# Patient Record
Sex: MALE | Race: WHITE | Employment: FULL TIME | URBAN - METROPOLITAN AREA
[De-identification: names, ages, dates, MRNs, and addresses within clinical notes are randomized per-mention and may not be internally consistent; named-entity substitution may affect disease eponyms.]

---

## 2018-01-21 ENCOUNTER — APPOINTMENT (OUTPATIENT)
Dept: CT IMAGING | Age: 53
DRG: 252 | End: 2018-01-21
Attending: EMERGENCY MEDICINE
Payer: OTHER GOVERNMENT

## 2018-01-21 ENCOUNTER — HOSPITAL ENCOUNTER (INPATIENT)
Age: 53
LOS: 3 days | Discharge: HOME | DRG: 252 | End: 2018-01-24
Attending: EMERGENCY MEDICINE | Admitting: INTERNAL MEDICINE
Payer: OTHER GOVERNMENT

## 2018-01-21 DIAGNOSIS — I26.99 OTHER ACUTE PULMONARY EMBOLISM WITHOUT ACUTE COR PULMONALE (HCC): ICD-10-CM

## 2018-01-21 DIAGNOSIS — R55 SYNCOPE AND COLLAPSE: Primary | ICD-10-CM

## 2018-01-21 LAB
ALBUMIN SERPL-MCNC: 3.1 G/DL (ref 3.4–5)
ALBUMIN/GLOB SERPL: 0.7 {RATIO} (ref 0.8–1.7)
ALP SERPL-CCNC: 80 U/L (ref 45–117)
ALT SERPL-CCNC: 30 U/L (ref 16–61)
ANION GAP SERPL CALC-SCNC: 8 MMOL/L (ref 3–18)
APTT PPP: 23 SEC (ref 23–36.4)
APTT PPP: 41 SEC (ref 23–36.4)
AST SERPL-CCNC: 39 U/L (ref 15–37)
BASOPHILS # BLD: 0 K/UL (ref 0–0.1)
BASOPHILS NFR BLD: 0 % (ref 0–2)
BILIRUB SERPL-MCNC: 0.6 MG/DL (ref 0.2–1)
BNP SERPL-MCNC: 189 PG/ML (ref 0–900)
BUN SERPL-MCNC: 13 MG/DL (ref 7–18)
BUN/CREAT SERPL: 13 (ref 12–20)
CALCIUM SERPL-MCNC: 8.8 MG/DL (ref 8.5–10.1)
CHLORIDE SERPL-SCNC: 107 MMOL/L (ref 100–108)
CK MB CFR SERPL CALC: 5.7 % (ref 0–4)
CK MB CFR SERPL CALC: ABNORMAL % (ref 0–4)
CK MB SERPL-MCNC: 2.5 NG/ML (ref 5–25)
CK MB SERPL-MCNC: <1 NG/ML (ref 5–25)
CK SERPL-CCNC: 37 U/L (ref 39–308)
CK SERPL-CCNC: 44 U/L (ref 39–308)
CO2 SERPL-SCNC: 28 MMOL/L (ref 21–32)
CREAT SERPL-MCNC: 0.98 MG/DL (ref 0.6–1.3)
DIFFERENTIAL METHOD BLD: ABNORMAL
EOSINOPHIL # BLD: 0.2 K/UL (ref 0–0.4)
EOSINOPHIL NFR BLD: 2 % (ref 0–5)
ERYTHROCYTE [DISTWIDTH] IN BLOOD BY AUTOMATED COUNT: 16.6 % (ref 11.6–14.5)
GLOBULIN SER CALC-MCNC: 4.7 G/DL (ref 2–4)
GLUCOSE SERPL-MCNC: 97 MG/DL (ref 74–99)
HCT VFR BLD AUTO: 41.8 % (ref 36–48)
HGB BLD-MCNC: 14 G/DL (ref 13–16)
INR PPP: 1 (ref 0.8–1.2)
LYMPHOCYTES # BLD: 2.6 K/UL (ref 0.9–3.6)
LYMPHOCYTES NFR BLD: 26 % (ref 21–52)
MCH RBC QN AUTO: 26.9 PG (ref 24–34)
MCHC RBC AUTO-ENTMCNC: 33.5 G/DL (ref 31–37)
MCV RBC AUTO: 80.4 FL (ref 74–97)
MONOCYTES # BLD: 0.8 K/UL (ref 0.05–1.2)
MONOCYTES NFR BLD: 8 % (ref 3–10)
NEUTS SEG # BLD: 6.6 K/UL (ref 1.8–8)
NEUTS SEG NFR BLD: 64 % (ref 40–73)
PLATELET # BLD AUTO: 158 K/UL (ref 135–420)
PMV BLD AUTO: 9.9 FL (ref 9.2–11.8)
POTASSIUM SERPL-SCNC: 3.4 MMOL/L (ref 3.5–5.5)
PROT SERPL-MCNC: 7.8 G/DL (ref 6.4–8.2)
PROTHROMBIN TIME: 12.8 SEC (ref 11.5–15.2)
RBC # BLD AUTO: 5.2 M/UL (ref 4.7–5.5)
SODIUM SERPL-SCNC: 143 MMOL/L (ref 136–145)
TROPONIN I SERPL-MCNC: 0.11 NG/ML (ref 0–0.04)
TROPONIN I SERPL-MCNC: 1.08 NG/ML (ref 0–0.04)
WBC # BLD AUTO: 10.3 K/UL (ref 4.6–13.2)

## 2018-01-21 PROCEDURE — 70450 CT HEAD/BRAIN W/O DYE: CPT

## 2018-01-21 PROCEDURE — 74011250636 HC RX REV CODE- 250/636: Performed by: EMERGENCY MEDICINE

## 2018-01-21 PROCEDURE — 96366 THER/PROPH/DIAG IV INF ADDON: CPT

## 2018-01-21 PROCEDURE — 85730 THROMBOPLASTIN TIME PARTIAL: CPT | Performed by: EMERGENCY MEDICINE

## 2018-01-21 PROCEDURE — 85025 COMPLETE CBC W/AUTO DIFF WBC: CPT | Performed by: EMERGENCY MEDICINE

## 2018-01-21 PROCEDURE — 93005 ELECTROCARDIOGRAM TRACING: CPT

## 2018-01-21 PROCEDURE — 71275 CT ANGIOGRAPHY CHEST: CPT

## 2018-01-21 PROCEDURE — 99284 EMERGENCY DEPT VISIT MOD MDM: CPT

## 2018-01-21 PROCEDURE — 74011636320 HC RX REV CODE- 636/320: Performed by: EMERGENCY MEDICINE

## 2018-01-21 PROCEDURE — C1751 CATH, INF, PER/CENT/MIDLINE: HCPCS

## 2018-01-21 PROCEDURE — 83880 ASSAY OF NATRIURETIC PEPTIDE: CPT | Performed by: EMERGENCY MEDICINE

## 2018-01-21 PROCEDURE — 75810000137 HC PLCMT CENT VENOUS CATH

## 2018-01-21 PROCEDURE — 65270000029 HC RM PRIVATE

## 2018-01-21 PROCEDURE — 74011000250 HC RX REV CODE- 250: Performed by: PHYSICIAN ASSISTANT

## 2018-01-21 PROCEDURE — 82550 ASSAY OF CK (CPK): CPT | Performed by: EMERGENCY MEDICINE

## 2018-01-21 PROCEDURE — 80053 COMPREHEN METABOLIC PANEL: CPT | Performed by: EMERGENCY MEDICINE

## 2018-01-21 PROCEDURE — 85610 PROTHROMBIN TIME: CPT | Performed by: EMERGENCY MEDICINE

## 2018-01-21 PROCEDURE — 96365 THER/PROPH/DIAG IV INF INIT: CPT

## 2018-01-21 RX ORDER — HEPARIN SODIUM 1000 [USP'U]/ML
80 INJECTION, SOLUTION INTRAVENOUS; SUBCUTANEOUS ONCE
Status: COMPLETED | OUTPATIENT
Start: 2018-01-21 | End: 2018-01-21

## 2018-01-21 RX ORDER — HEPARIN SODIUM 10000 [USP'U]/100ML
18-36 INJECTION, SOLUTION INTRAVENOUS
Status: DISCONTINUED | OUTPATIENT
Start: 2018-01-21 | End: 2018-01-23

## 2018-01-21 RX ADMIN — SODIUM CHLORIDE 1000 ML: 900 INJECTION, SOLUTION INTRAVENOUS at 16:45

## 2018-01-21 RX ADMIN — HEPARIN SODIUM AND DEXTROSE 18 UNITS/KG/HR: 10000; 5 INJECTION INTRAVENOUS at 14:11

## 2018-01-21 RX ADMIN — HEPARIN SODIUM 8640 UNITS: 1000 INJECTION, SOLUTION INTRAVENOUS; SUBCUTANEOUS at 14:23

## 2018-01-21 RX ADMIN — SODIUM CHLORIDE 1000 ML: 900 INJECTION, SOLUTION INTRAVENOUS at 16:05

## 2018-01-21 RX ADMIN — ALTEPLASE 100 MG: KIT at 17:30

## 2018-01-21 RX ADMIN — IOPAMIDOL 90 ML: 755 INJECTION, SOLUTION INTRAVENOUS at 14:52

## 2018-01-21 RX ADMIN — FAMOTIDINE 20 MG: 10 INJECTION, SOLUTION INTRAVENOUS at 17:20

## 2018-01-21 RX ADMIN — SODIUM CHLORIDE 1000 ML: 900 INJECTION, SOLUTION INTRAVENOUS at 17:39

## 2018-01-21 RX ADMIN — HEPARIN SODIUM AND DEXTROSE 22 UNITS/KG/HR: 10000; 5 INJECTION INTRAVENOUS at 21:03

## 2018-01-21 NOTE — CONSULTS
[facility]  Pulmonary, Critical Care, and Sleep Medicine      Name: Tucker Bright MRN: [id number]   : 1965 Hospital: [address] [place] [address]   Date: 2018          Critical Care Initial Patient Consult    Requesting MD: Dr. Corinne Esteban                                                 Reason for CC Consult: PE    IMPRESSION:   · Acute bilateral pulmonary embolism, possible massive with intermittent hypotension cycling with normotensive pressures. No echo as of yet to determine RV stain/dilation. RV strain by CT via RV/LV ratio. Troponin 0.11, . 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 Titrate to maintain SpO2 >92%  · ID - No acute issues  · CVS - Bilateral pulmonary embolism as described above. Borderline hypotension. Currently receiving IVF bolus for hypotension. Monitor for improvement in BP. Continue heparin gtt. If he has refractory hypotension then this would qualify as \"massive\" PE and should be treated accordingly. Needs echo. · Heme/Onc- No acute issues. · Metabolic - Monitor electrolytes and replace per protocol. · Renal - No acute issues   · Endocrine -  No acute issues   · Neuro/ Pain/ Sedation - CT head nil acute. · GI - NPO  · Prophylaxis - DVT (heparin gtt), GI (start protonix)  · Should have CVL placed prior to tPA in the case of refractory hypotension requiring vasopressors. · This case has been extensively discussed and managed by ER MD, Dr. Corinne Esteban and [facility]. Subjective/History: This patient has been seen and evaluated at the request of Dr. Corinne Esteban for pulmonary embolism. Patient is a 46 y.o. 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 Reports taking long road trip several weeks ago.  There was strong clinical suspicion for PE and he underwent CTA which did show bilateral acute pulmonary emboli. Originally he was hemodynamically stable, however he slowly started to drop his blood pressure requiring IVF boluses. There has been discussions with patient regarding risk vs benefits of systemic thrombolysis between patient and ER MD.  At this time, the patient is wanting to pursue thrombolytics. Further history is limited at this time. No past medical history on file. No past surgical history on file. Prior to Admission medications    Not on File     Current Facility-Administered Medications   Medication Dose Route Frequency    heparin 25,000 units in D5W 250 ml infusion  18-36 Units/kg/hr (Order-Specific) IntraVENous TITRATE    alteplase (ACTIVASE) infusion for PE or AMI   IntraVENous ONCE    sodium chloride 0.9 % bolus infusion 1,000 mL  1,000 mL IntraVENous ONCE    iopamidol (ISOVUE-370) 76 % injection  mL   mL IntraVENous RAD ONCE     No Known Allergies   Social History   Substance Use Topics    Smoking status: Not on file    Smokeless tobacco: Not on file    Alcohol use Not on file      No family history on file. Review of Systems:  Pertinent items are noted in HPI. Objective:   Vital Signs:    Visit Vitals    BP 92/67    Pulse (!) 138    Temp 97.8 °F (36.6 °C)    Resp 17    Wt 108.2 kg (238 lb 9.6 oz)    SpO2 100%               Temp (24hrs), Av.8 °F (36.6 °C), Min:97.8 °F (36.6 °C), Max:97.8 °F (36.6 °C)       Intake/Output:   Last shift:         Last 3 shifts:    No intake or output data in the 24 hours ending 18 0936    Physical Exam:   General: Middle aged male, laying in bed, talks in complete sentences   HEENT: Normocephalic and atraumatic. Conjunctivae clear. No scleral icterus. Neck: Supple. Trachea midline. No JVD. Chest: No bruising or deformity. Lungs: Symmetrical chest rise and fall. No accessory muscle usage. Heart: tachycardicrate and regular rhythm.      Abdomen:  Soft    Extremity: Extremities without cyanosis or edema. Neuro: Speech clear. No facial droop. Moves all extremities. Skin: Warm, dry. Data:     Recent Results (from the past 24 hour(s))   CBC WITH AUTOMATED DIFF    Collection Time: 01/21/18  2:07 PM   Result Value Ref Range    WBC 10.3 4.6 - 13.2 K/uL    RBC 5.20 4.70 - 5.50 M/uL    HGB 14.0 13.0 - 16.0 g/dL    HCT 41.8 36.0 - 48.0 %    MCV 80.4 74.0 - 97.0 FL    MCH 26.9 24.0 - 34.0 PG    MCHC 33.5 31.0 - 37.0 g/dL    RDW 16.6 (H) 11.6 - 14.5 %    PLATELET 413 442 - 174 K/uL    MPV 9.9 9.2 - 11.8 FL    NEUTROPHILS 64 40 - 73 %    LYMPHOCYTES 26 21 - 52 %    MONOCYTES 8 3 - 10 %    EOSINOPHILS 2 0 - 5 %    BASOPHILS 0 0 - 2 %    ABS. NEUTROPHILS 6.6 1.8 - 8.0 K/UL    ABS. LYMPHOCYTES 2.6 0.9 - 3.6 K/UL    ABS. MONOCYTES 0.8 0.05 - 1.2 K/UL    ABS. EOSINOPHILS 0.2 0.0 - 0.4 K/UL    ABS. BASOPHILS 0.0 0.0 - 0.1 K/UL    DF AUTOMATED     METABOLIC PANEL, COMPREHENSIVE    Collection Time: 01/21/18  2:07 PM   Result Value Ref Range    Sodium 143 136 - 145 mmol/L    Potassium 3.4 (L) 3.5 - 5.5 mmol/L    Chloride 107 100 - 108 mmol/L    CO2 28 21 - 32 mmol/L    Anion gap 8 3.0 - 18 mmol/L    Glucose 97 74 - 99 mg/dL    BUN 13 7.0 - 18 MG/DL    Creatinine 0.98 0.6 - 1.3 MG/DL    BUN/Creatinine ratio 13 12 - 20      GFR est AA >60 >60 ml/min/1.73m2    GFR est non-AA >60 >60 ml/min/1.73m2    Calcium 8.8 8.5 - 10.1 MG/DL    Bilirubin, total 0.6 0.2 - 1.0 MG/DL    ALT (SGPT) 30 16 - 61 U/L    AST (SGOT) 39 (H) 15 - 37 U/L    Alk.  phosphatase 80 45 - 117 U/L    Protein, total 7.8 6.4 - 8.2 g/dL    Albumin 3.1 (L) 3.4 - 5.0 g/dL    Globulin 4.7 (H) 2.0 - 4.0 g/dL    A-G Ratio 0.7 (L) 0.8 - 1.7     PROTHROMBIN TIME + INR    Collection Time: 01/21/18  2:07 PM   Result Value Ref Range    Prothrombin time 12.8 11.5 - 15.2 sec    INR 1.0 0.8 - 1.2     NT-PRO BNP    Collection Time: 01/21/18  2:07 PM   Result Value Ref Range    NT pro- 0 - 900 PG/ML   CARDIAC PANEL,(CK, CKMB & TROPONIN)    Collection Time: 01/21/18  2:07 PM   Result Value Ref Range    CK 37 (L) 39 - 308 U/L    CK - MB <1.0 <3.6 ng/ml    CK-MB Index  0.0 - 4.0 %     CALCULATION NOT PERFORMED WHEN RESULT IS BELOW LINEAR LIMIT    Troponin-I, Qt. 0.11 (H) 0.0 - 0.045 NG/ML   PTT    Collection Time: 01/21/18  2:07 PM   Result Value Ref Range    aPTT 23.0 23.0 - 36.4 SEC             Telemetry:Sinus tachycardia    Imaging:  CXR Results  (Last 48 hours)    None          CT Results  (Last 48 hours)               01/21/18 1523  CTA CHEST W OR W WO CONT Final result    Impression:  Impression:         1. Acute pulmonary embolism, very large burden bilaterally extending to the   level of the central left and right pulmonary arteries. Evidence for right heart   strain as detailed above. I have telephoned a wet reading regarding this critical result directly to Dr. Nickolas Clayton prior to dictation of this report at 3:45 PM on 1/21/2018.       2.  Small subpleural regions of groundglass attenuation bilaterally, suspicious   for small areas of hemorrhage due to ischemia or infarct in the setting of acute   pulmonary embolism as discussed above. 3.  Borderline enlarged mediastinal and upper abdominal lymph nodes,   approximately 1 cm, nonspecific. 4.  Right thyroid lobe is mildly enlarged compared to the left as noted above. Narrative:  CTA OF THE CHEST, WITH CORONAL AND SAGITTAL REFORMATTED MIP IMAGES, PULMONARY   EMBOLISM PROTOCOL       CPT CODE: 04759       INDICATION: Syncopal episode. Reported history of sagittal pulmonary embolism   treated elsewhere in the past.  Clinical concern for acute pulmonary embolism. COMPARISON: No priors in PACS       TECHNIQUE: With IV administration of 80 ml Isovue-370, axial CT images through   the thorax were obtained using helical technique following a pulmonary embolism   protocol.   In order more optimally to evaluate the pulmonary arterial tree in   multiplanar CT angiographic fashion, coronal and sagittal reformation maximum   intensity projection (MIP) images were also performed. All CT scans at this   facility are performed using dose optimization technique as appropriate to the   performed exam, to include automated exposure control, adjustment of the mA   and/or kV according to patient's size (Including appropriate matching for   site-specific examinations), or use of iterative reconstruction technique. FINDINGS:       Adequate contrast bolus. Extensive large occlusive and nearly occlusive filling   defects consistent with acute pulmonary embolism in the upper and lower lobes   bilaterally extending to the level of the central right and left pulmonary   arteries consistent with a very large burden of acute pulmonary embolism. There   are findings for right heart strain. The main pulmonary artery measures   approximately 3.4 cm in caliber. There is some reflux of contrast inferiorly   into the IVC. There is bowing of the interventricular septum toward the left   ventricle. The right atrium and right ventricle are dilated compared to the   left. Patchy groundglass attenuation in the subpleural right midlung laterally   anterior to the major fissure near the level of the minor fissure. Additional   patchy groundglass attenuation in the subpleural superior segment left lower   lobe. In the setting of acute pulmonary embolism, findings are suspicious for   hemorrhage due to pulmonary ischemia/infarct. CT follow-up might be helpful   after therapy to assess for resolution. No evidence of pleural or significant pericardial effusion is seen. Multiple small to borderline sized lymph nodes scattered in the mediastinum   bilaterally, up to approximately 1 cm in the superior mediastinum bilaterally.         The right thyroid lobe appears enlarged compared to the left without a   measurable clearly definable thyroid nodule by CT, which could be better assessed on follow-up thyroid ultrasound as clinically warranted. The thoracic aorta enhances normally. Cardiac motion artifacts noted. Lymph nodes noted in the region of the gastrohepatic ligament, the larger   borderline in size for lymphadenopathy, 1 cm in diameter, nonspecific (reference   CT axial series 2 images 248-251). On review of bone windows, no acute fractures or destructive bone lesions are   seen. 01/21/18 1523  CT HEAD WO CONT Final result    Impression:  Impression:        No CT evidence of acute intracranial pathology. Narrative:  NONCONTRAST HEAD CT       CPT CODE: 81311       INDICATION: Above. Syncopal episode       COMPARISON: None       TECHNIQUE: Serial axial CT images through the brain were obtained without   administration of intravenous contrast. Additional coronal and sagittal   reformation images were also performed. All CT scans at this facility are   performed using dose optimization technique as appropriate to the performed   exam, to include automated exposure control, adjustment of the mA and/or kV   according to patient's size (Including appropriate matching for site-specific   examinations), or use of iterative reconstruction technique. FINDINGS:       The sulci and ventricles are within normal limits in size and configuration. There is no evidence of acute intracranial hemorrhage. No edema, midline shift or other mass effect is seen. No mass lesion   identified. No evidence of acute ischemic stroke/ cerebrovascular accident (CVA) is   identified. Head CT is often insensitive early to acute ischemic stroke. The visualized portions of the paranasal sinuses demonstrate no significant   mucosal pathology. The mastoid air cells are aerated  The calvarium appears   intact.                        Total critical care time exclusive of procedures:  minutes  Nadia Landin PA-C

## 2018-01-21 NOTE — ED PROVIDER NOTES
EMERGENCY DEPARTMENT HISTORY AND PHYSICAL EXAM    1:58 PM      Date: 1/21/2018  Patient Name: Delroy Reaves    History of Presenting Illness     No chief complaint on file. History Provided By: Patient and Patient's Wife    Additional History (Context): Deann Akers is a 46 y.o. male with hx of PE who presents to ED via EMT with c/o constant moderate SOB onset 45 minutes. Pt states he took a shower and got dressed when the SOB began. Pt tried to catch his breath and then had a syncopal episode prior to calling for EMT. Associated sx of intermittent chest tightness per pt. Pt reports having a long road trip to [address] [place] [address] 3 weeks ago. Wife states pt was complaining of right leg pain last week but pt states pain was different from saddle PE he had previously. Pt denies hx of bleeding. Denies use of blood thinners. PCP: No primary care provider on file. Chief Complaint: SOB  Duration: 45 Minutes  Timing:  Constant  Location:   Quality:   Severity: Moderate  Modifying Factors: syncopal episode PTA, long car trip  Associated Symptoms: syncopal episode, chest tightness, right leg pain          Past History     Past Medical History:  No past medical history on file. Past Surgical History:  No past surgical history on file. Family History:  No family history on file. Social History:  Social History   Substance Use Topics    Smoking status: Not on file    Smokeless tobacco: Not on file    Alcohol use Not on file       Allergies:  No Known Allergies      Review of Systems     Review of Systems   Respiratory: Positive for chest tightness and shortness of breath. Musculoskeletal:        Right leg pain    Neurological: Positive for syncope. All other systems reviewed and are negative.       Physical Exam     Visit Vitals    BP 92/67    Pulse (!) 138    Temp 97.8 °F (36.6 °C)    Resp 17    Wt 108.2 kg (238 lb 9.6 oz)    SpO2 100%       Physical Exam   Constitutional: He is oriented to person, place, and time. He appears well-developed. HENT:   Head: Normocephalic and atraumatic. Eyes: EOM are normal. Pupils are equal, round, and reactive to light. Neck: Normal range of motion. Neck supple. Cardiovascular: Exam reveals no friction rub. No murmur heard. Tachycardic   Pulmonary/Chest: Effort normal and breath sounds normal. No respiratory distress. He has no wheezes. Abdominal: Soft. He exhibits no distension. There is no tenderness. There is no rebound and no guarding. Musculoskeletal: Normal range of motion. Neurological: He is alert and oriented to person, place, and time. Skin: Skin is warm and dry. Psychiatric: He has a normal mood and affect. His behavior is normal. Thought content normal.         Diagnostic Study Results     CT Head WO Cont  Impression:      No CT evidence of acute intracranial pathology. CTA CHEST  Impression:       1. Acute pulmonary embolism, very large burden bilaterally extending to the  level of the central left and right pulmonary arteries. Evidence for right heart  strain as detailed above.     I have telephoned a wet reading regarding this critical result directly to Dr. Yue Mclaughlin prior to dictation of this report at 3:45 PM on 1/21/2018.     2.  Small subpleural regions of groundglass attenuation bilaterally, suspicious  for small areas of hemorrhage due to ischemia or infarct in the setting of acute  pulmonary embolism as discussed above.     3.  Borderline enlarged mediastinal and upper abdominal lymph nodes,  approximately 1 cm, nonspecific.     4.  Right thyroid lobe is mildly enlarged compared to the left as noted above. Medical Decision Making   EKG shows sinus tach 140 R axis normal intervals diffuse ST depression or hypertrophy incomplete right bundle    3:37PM - Accompanied pt to CT due to critical nature. Brief read shows PE with no saddle.   Pt has not been below 90 for less than 15 minutes and has essentially stayed hypertensive. this is a brief summary of the patient's care is most of the time has been spent bedside. .  I accompanied the patient to CT and discussing with family he presented with syncope,  Patient felt short of breath and passed out and woke up short of breath. Cardiac hypoxic. Initially normotensive now hypotensive CT showed a large PE. He has a history of PE with syncope which she did receive thrombolytics for. We discussed the risks benefits of thrombolytics including one in 7 risk of  Significant bleeding within the small risk of death. Understanding like to pursue it. He has no exclusionary criteria for TPA. His wife is bedside     discussed the case with the ICU there are bedside as well we are discussing the options momentarily went above 90 systolic for pressure went to 255 systolic after fluids but is reported to his hypotension again if this persists for 15 minutes we will likely give TPA .     4:24 PM bp 101        CRITICAL CARE:  4:14 PM  I have spent 120 minutes of critical care time involved in lab review, consultations with specialist, family decision-making, and documentation. During this entire length of time I was immediately available to the patient. Critical Care: The reason for providing this level of medical care for this critically ill patient was due a critical illness that impaired one or more vital organ systems such that there was a high probability of imminent or life threatening deterioration in the patients condition. This care involved high complexity decision making to assess, manipulate, and support vital system functions, to treat this degreee vital organ system failure and to prevent further life threatening deterioration of the patients condition. 5:05 PM     Ultrasound-guided right femoral central line placed using Seldinger technique without complication. All ports flushed easily  Placement of wire and cath with US. Blood return on all port. s      at this point the patient's blood pressures waxing and waning she does occasionally becomes up over 100 but ultimately he is under The pressure of 90 systolic. Placed central line in anticipation of tpa     D/w pt famil  ICU; bp nows 60s will give TPA. TPA infusing. Going to ICUl    5:45 PM critical care 180 min. Diagnosis     No diagnosis found. _______________________________    Attestations:  Scribe Attestation     Cristofer Guajardo acting as a scribe for and in the presence of Cande Gotti MD      January 21, 2018 at 1:55 PM       Provider Attestation:      I personally performed the services described in the documentation, reviewed the documentation, as recorded by the scribe in my presence, and it accurately and completely records my words and actions.  January 21, 2018 at 1:55 PM - Cande Gotti MD    _______________________________

## 2018-01-21 NOTE — IP AVS SNAPSHOT
[address] [address] 
[phone] Patient: Tucker Bright MRN: [id number] :1965 About your hospitalization You were admitted on:  2018 You last received care in the:  [facility] 2 CV STEPDOWN You were discharged on:  2018 Why you were hospitalized Your primary diagnosis was:  Not on File Your diagnoses also included:  Pulmonary Emboli (Hcc) Follow-up Information Follow up With Details Comments Contact Info Soraya Jaimes MD In 2 weeks  [address] [place] [address] [address] [address] 
[phone] Gaby Cameron MD In 3 weeks New patient consultation for hypercoagulable state and recurrent PE and DVT 27 [facility] 105 200 [facility] 
[phone] Provider Unknown   Patient not available to ask Discharge Orders None A check shaye indicates which time of day the medication should be taken. My Medications START taking these medications Instructions Each Dose to Equal  
 Morning Noon Evening Bedtime * apixaban 5 mg tablet Commonly known as:  Jerel To Your last dose was: Your next dose is: Take 2 Tabs by mouth two (2) times a day for 5 days. Indications: pulmonary thromboembolism 10 mg  
    
   
   
   
  
 * apixaban 5 mg tablet Commonly known as:  Jerel To Your last dose was: Your next dose is:    
   
   
 Start this medication on AFTER Elequis 10 mg dose has been completed  Indications: pulmonary thromboembolism  
     
   
   
   
  
 hydroCHLOROthiazide 25 mg tablet Commonly known as:  HYDRODIURIL Start taking on:  2018 Your last dose was: Your next dose is: Take 1 Tab by mouth daily. 25 mg  
    
   
   
   
  
 * Notice:   This list has 2 medication(s) that are the same as other medications prescribed for you. Read the directions carefully, and ask your doctor or other care provider to review them with you. Where to Get Your Medications Information on where to get these meds will be given to you by the nurse or doctor. ! Ask your nurse or doctor about these medications  
  apixaban 5 mg tablet  
 apixaban 5 mg tablet  
 hydroCHLOROthiazide 25 mg tablet Discharge Instructions DISCHARGE SUMMARY from Nurse PATIENT INSTRUCTIONS: 
 
 
F-face looks uneven A-arms unable to move or move unevenly S-speech slurred or non-existent T-time-call 911 as soon as signs and symptoms begin-DO NOT go Back to bed or wait to see if you get better-TIME IS BRAIN. Warning Signs of HEART ATTACK Call 911 if you have these symptoms: 
? Chest discomfort. Most heart attacks involve discomfort in the center of the chest that lasts more than a few minutes, or that goes away and comes back. It can feel like uncomfortable pressure, squeezing, fullness, or pain. ? Discomfort in other areas of the upper body. Symptoms can include pain or discomfort in one or both arms, the back, neck, jaw, or stomach. ? Shortness of breath with or without chest discomfort. ? Other signs may include breaking out in a cold sweat, nausea, or lightheadedness. Don't wait more than five minutes to call [address]! Fast action can save your life. Calling 911 is almost always the fastest way to get lifesaving treatment. Emergency Medical Services staff can begin treatment when they arrive  up to an hour sooner than if someone gets to the hospital by car. The discharge information has been reviewed with the patient.   The patient verbalized understanding. Discharge medications reviewed with the patient and appropriate educational materials and side effects teaching were provided. ___________________________________________________________________________________________________________________________________ Learning About How to Prevent Blood Clots What is a blood clot? A blood clot is a clump of blood that forms in a blood vessel, such as a vein or an artery. If a clot gets stuck in a blood vessel, it can cause serious problems like a deep vein thrombosis (DVT) or a pulmonary embolism. A DVT is a blood clot in certain veins of the legs, pelvis, or arms. It most often occurs in the legs. Blood clots in these veins need to be treated, because they can get bigger, break loose, and travel through the bloodstream to the heart and then to the lungs. This causes a pulmonary embolism. A pulmonary embolism is a sudden blockage of an artery in the lung. Blood clots in the deep veins of the leg are the most common cause of a pulmonary embolism. In many cases, the clots are small. They may damage the lung. But if the clot is large and stops blood flow to the lung, it can be deadly. What increases your risk for blood clots? Some of the things that can increase your risk for a blood clot include: 
Slowed blood flow When blood doesn't flow normally, clots are more likely to develop. Reduced blood flow may result from long-term bed rest, such as after a surgery, injury, or serious illness. Or it may result from sitting for a long time, especially when traveling long distances. Abnormal clotting Some people have blood that clots too easily or too quickly. Problems that may cause increased clotting include: 
· Having certain blood problems that make blood clot too easily. This is a problem that may run in families. · Having certain health problems, such as cancer, heart failure, stroke, or severe infection. · Being pregnant. A woman's risk of getting blood clots increases both during pregnancy and shortly after delivery or after a  section. · Using hormonal forms of birth control or hormone therapy. · Smoking. Injury to the blood vessel wall Blood is more likely to clot in veins and arteries shortly after they are injured. Injury can be caused by a recent medical procedure or surgery that involved your legs, hips, belly, or brain. Or it can be caused by an injury, such as a broken hip. What can you do to prevent blood clots? After any procedure or event that increases your risk · Take a blood-thinning medicine (called an anticoagulant) as directed if your doctor prescribes one. · Exercise your lower leg muscles to help keep the blood moving through your legs. Point your toes up toward your head so the calves of your legs are stretched, then relax. Repeat. This is a good exercise to do when you are sitting for long periods of time. · Get up out of bed as soon as you safely can or as soon as your doctor says it's okay after an illness or surgery. If you can't get out of bed, you can do the leg exercise described above. Try to do this leg exercise every hour when you are awake. This will help keep the blood moving through your legs. If you are in the hospital and need to stay in bed, your doctor may have you use a special device that inflates and deflates knee-high boots to help keep blood from pooling in your legs. · Use compression stockings if your doctor prescribes them. These are specially fitted stockings that may prevent blood clots by keeping blood from pooling in your legs. When you travel · Take breaks when you travel. On long car trips, stop the car and walk around every hour or so. On long bus or train rides or plane flights, get out of your seat and walk up and down the aisle every hour or so. · Do leg exercises while you are seated.  For example, pump your feet up and down by pulling your toes up toward your knees and then pointing them down. If you already have a risk of blood clots, talk to your doctor before taking a long trip. Your doctor may want you to wear compression stockings or take blood-thinning medicine. Take care of your body · Be active. Try to get 30 minutes or more of activity on most days of the week. · Don't smoke. Smoking can increase your risk of blood clots. If you need help quitting, talk to your doctor about stop-smoking programs and medicines. · Check with your doctor about whether you should use hormonal forms of birth control or hormone therapy. These may increase your risk of blood clots. When should you call for help? Call 911 anytime you think you may need emergency care. For example, call if: 
· You have symptoms of a blood clot in your lung (called a pulmonary embolism). These may include: 
¨ Sudden chest pain. ¨ Trouble breathing. ¨ Coughing up blood. Call your doctor now or seek immediate medical care if: 
· You have symptoms of a blood clot in your arm or leg (called a deep vein thrombosis). These may include: 
¨ Pain in the arm, calf, back of the knee, thigh, or groin. ¨ Redness and swelling in the arm, leg, or groin. Where can you learn more? Go to http://amna-dwayne.info/. Enter F229 in the search box to learn more about \"Learning About How to Prevent Blood Clots. \" Current as of: March 20, 2017 Content Version: 11.4 © 4338-0569 Healthwise, Incorporated. Care instructions adapted under license by [facility] (which disclaims liability or warranty for this information). If you have questions about a medical condition or this instruction, always ask your healthcare professional. [address] [address] any warranty or liability for your use of this information. Vena Cava Filter Placement: What to Expect at Home Your Recovery A vena cava filter was put into the vena cava using a thin, flexible tube (catheter) that was inserted through a vein in your neck or groin. A vena cava filter helps prevent blood clots from traveling to the lungs and heart, where they may block blood flow. The filter may be permanent or it may be removed later. Vena cava filters may be used if you have problems taking a medicine (called a blood thinner) that prevents blood clots. After having a vena cava filter placed, you may feel tired and have some pain for several days. You may have a small bandage where the catheter was placed. This care sheet gives you a general idea about how long it will take for you to recover. But each person recovers at a different pace. Follow the steps below to feel better as quickly as possible. How can you care for yourself at home? Activity ? · Take it easy for a day or two. Avoid strenuous activities, such as bicycle riding, jogging, weight lifting, or aerobic exercise, until your doctor says it is okay. Diet ? · You can eat your normal diet. If your stomach is upset, try bland, low-fat foods like plain rice, broiled chicken, toast, and yogurt. ? · Drink plenty of fluids to avoid becoming dehydrated. Medicines ? · Your doctor will tell you if and when you can restart your medicines. He or she will also give you instructions about taking any new medicines. ? · If you take blood thinners, such as warfarin (Coumadin), clopidogrel (Plavix), or aspirin, be sure to talk to your doctor. He or she will tell you if and when to start taking those medicines again. Make sure that you understand exactly what your doctor wants you to do. ? · Be safe with medicines. Take pain medicines exactly as directed. ¨ If the doctor gave you a prescription medicine for pain, take it as prescribed. ¨ If you are not taking a prescription pain medicine, ask your doctor if you can take an over-the-counter medicine. ? · If you think your pain medicine is making you sick to your stomach: 
¨ Take your medicine after meals (unless your doctor has told you not to). ¨ Ask your doctor for a different pain medicine. ?Care of the catheter site ? · Keep a bandage over the spot where the catheter was inserted for the first day, or for as long as your doctor recommends. ? · Put ice or a cold pack on the area for 10 to 20 minutes at a time to help with soreness or swelling. Do this every few hours. Put a thin cloth between the ice and your skin. Follow-up care is a key part of your treatment and safety. Be sure to make and go to all appointments, and call your doctor if you are having problems. It's also a good idea to know your test results and keep a list of the medicines you take. When should you call for help? Call 911 anytime you think you may need emergency care. For example, call if: 
? · You passed out (lost consciousness). ? · You have severe trouble breathing. ? · You have sudden chest pain and shortness of breath, or you cough up blood. ?Call your doctor now or seek immediate medical care if: 
? · You have pain that does not get better after you take pain medicine. ? · You are bleeding through your dressing. A small amount of bleeding is normal.  
? · You have a fast-growing, painful lump at the catheter site. ? · You have signs of infection, such as: 
¨ Increased pain, swelling, warmth, or redness. ¨ Red streaks leading from the incision. ¨ Pus draining from the incision. ¨ A fever. ? · You have symptoms of a blood clot in your leg, such as: 
¨ Pain in the calf, back of the knee, thigh, or groin. ¨ Redness and swelling in your leg or groin. ? Watch closely for any changes in your health, and be sure to contact your doctor if you have any problems. Where can you learn more? Go to http://amna-dwayne.info/.  
Enter Z937 in the search box to learn more about \"Vena Cava Filter Placement: What to Expect at Home. \" Current as of: March 20, 2017 Content Version: 11.4 © 5860-7453 [employer]. Care instructions adapted under license by [employer] (which disclaims liability or warranty for this information). If you have questions about a medical condition or this instruction, always ask your healthcare professional. [address] any warranty or liability for your use of this information. [facility] Announcement We are excited to announce that we are making your provider's discharge notes available to you in [employer]. You will see these notes when they are completed and signed by the physician that discharged you from your recent hospital stay. If you have any questions or concerns about any information you see in [employer], please call the Health Information Department where you were seen or reach out to your Primary Care Provider for more information about your plan of care. Introducing \[facility]\"" & HEALTH SERVICES! [employer] introduces [employer] patient portal. Now you can access parts of your medical record, email your doctor's office, and request medication refills online. 1. In your internet browser, go to [email or web address] 2. Click on the First Time User? Click Here link in the Sign In box. You will see the New Member Sign Up page. 3. Enter your [employer] Access Code exactly as it appears below. You will not need to use this code after youve completed the sign-up process. If you do not sign up before the expiration date, you must request a new code. · [employer] Access Code: [email or web address] Expires: 4/21/2018  2:11 PM 
 
4. Enter the last four digits of your Social Security Number (xxxx) and Date of Birth (mm/dd/yyyy) as indicated and click Submit. You will be taken to the next sign-up page. 5. Create a [employer] ID.  This will be your [employer] login ID and cannot be changed, so think of one that is secure and easy to remember. 6. Create a [employer] password. You can change your password at any time. 7. Enter your Password Reset Question and Answer. This can be used at a later time if you forget your password. 8. Enter your e-mail address. You will receive e-mail notification when new information is available in [address] 9. Click Sign Up. You can now view and download portions of your medical record. 10. Click the Download Summary menu link to download a portable copy of your medical information. If you have questions, please visit the Frequently Asked Questions section of the [employer] website. Remember, [employer] is NOT to be used for urgent needs. For medical emergencies, dial 911. Now available from your iPhone and Android! Unresulted Labs-Please follow up with your PCP about these lab tests Order Current Status FACTOR II  DNA ANALYSIS In process FACTOR V LEIDEN In process LUPUS ANTICOAGULANT PANEL W/ REFLEX In process PHOSPHATIDYLSERINE ABS In process Providers Seen During Your Hospitalization Provider Specialty Primary office phone So Méndez MD Emergency Medicine [phone] Jesu Graff MD Infectious Diseases [phone] Your Primary Care Physician (PCP) Primary Care Physician Office Phone Office Fax UNKNOWN, PROVIDER ** None ** ** None ** You are allergic to the following No active allergies Recent Documentation Height Weight  
  
  
  
  
 1.854 m 108.2 kg Emergency Contacts Name Discharge Info Relation Home Work Mobile Jeni Reaves DISCHARGE CAREGIVER [3] Spouse [3] [phone] Patient Belongings The following personal items are in your possession at time of discharge: 
  Dental Appliances: None  Visual Aid: None      Home Medications: None   Jewelry: None Please provide this summary of care documentation to your next provider. Signatures-by signing, you are acknowledging that this After Visit Summary has been reviewed with you and you have received a copy. Patient Signature:  ____________________________________________________________ Date:  ____________________________________________________________  
  
Jeni Saver Provider Signature:  ____________________________________________________________ Date:  ____________________________________________________________

## 2018-01-22 LAB
ANION GAP SERPL CALC-SCNC: 7 MMOL/L (ref 3–18)
APTT PPP: 149.3 SEC (ref 23–36.4)
APTT PPP: 69.9 SEC (ref 23–36.4)
APTT PPP: 70.6 SEC (ref 23–36.4)
APTT PPP: >180 SEC (ref 23–36.4)
ATRIAL RATE: 140 BPM
ATRIAL RATE: 87 BPM
BNP SERPL-MCNC: 1334 PG/ML (ref 0–900)
BUN SERPL-MCNC: 13 MG/DL (ref 7–18)
BUN/CREAT SERPL: 17 (ref 12–20)
CALCIUM SERPL-MCNC: 7.9 MG/DL (ref 8.5–10.1)
CALCULATED P AXIS, ECG09: 34 DEGREES
CALCULATED P AXIS, ECG09: 36 DEGREES
CALCULATED R AXIS, ECG10: -6 DEGREES
CALCULATED R AXIS, ECG10: 20 DEGREES
CALCULATED T AXIS, ECG11: 1 DEGREES
CALCULATED T AXIS, ECG11: 5 DEGREES
CHLORIDE SERPL-SCNC: 106 MMOL/L (ref 100–108)
CK MB CFR SERPL CALC: 4.5 % (ref 0–4)
CK MB CFR SERPL CALC: 5.7 % (ref 0–4)
CK MB SERPL-MCNC: 2.2 NG/ML (ref 5–25)
CK MB SERPL-MCNC: 2.7 NG/ML (ref 5–25)
CK SERPL-CCNC: 47 U/L (ref 39–308)
CK SERPL-CCNC: 49 U/L (ref 39–308)
CO2 SERPL-SCNC: 28 MMOL/L (ref 21–32)
CREAT SERPL-MCNC: 0.77 MG/DL (ref 0.6–1.3)
DIAGNOSIS, 93000: NORMAL
DIAGNOSIS, 93000: NORMAL
GLUCOSE SERPL-MCNC: 86 MG/DL (ref 74–99)
INR PPP: 1.3 (ref 0.8–1.2)
MAGNESIUM SERPL-MCNC: 1.9 MG/DL (ref 1.6–2.6)
P-R INTERVAL, ECG05: 128 MS
P-R INTERVAL, ECG05: 142 MS
PHOSPHATE SERPL-MCNC: 3.1 MG/DL (ref 2.5–4.9)
POTASSIUM SERPL-SCNC: 3.4 MMOL/L (ref 3.5–5.5)
PROTHROMBIN TIME: 15.5 SEC (ref 11.5–15.2)
Q-T INTERVAL, ECG07: 278 MS
Q-T INTERVAL, ECG07: 380 MS
QRS DURATION, ECG06: 82 MS
QRS DURATION, ECG06: 92 MS
QTC CALCULATION (BEZET), ECG08: 424 MS
QTC CALCULATION (BEZET), ECG08: 457 MS
SODIUM SERPL-SCNC: 141 MMOL/L (ref 136–145)
TROPONIN I SERPL-MCNC: 0.48 NG/ML (ref 0–0.04)
TROPONIN I SERPL-MCNC: 0.91 NG/ML (ref 0–0.04)
VENTRICULAR RATE, ECG03: 140 BPM
VENTRICULAR RATE, ECG03: 87 BPM

## 2018-01-22 PROCEDURE — 83880 ASSAY OF NATRIURETIC PEPTIDE: CPT | Performed by: PHYSICIAN ASSISTANT

## 2018-01-22 PROCEDURE — 80048 BASIC METABOLIC PNL TOTAL CA: CPT | Performed by: PHYSICIAN ASSISTANT

## 2018-01-22 PROCEDURE — 83735 ASSAY OF MAGNESIUM: CPT | Performed by: PHYSICIAN ASSISTANT

## 2018-01-22 PROCEDURE — 74011250636 HC RX REV CODE- 250/636: Performed by: EMERGENCY MEDICINE

## 2018-01-22 PROCEDURE — 82550 ASSAY OF CK (CPK): CPT | Performed by: PHYSICIAN ASSISTANT

## 2018-01-22 PROCEDURE — 93970 EXTREMITY STUDY: CPT

## 2018-01-22 PROCEDURE — 77030018719 HC DRSG PTCH ANTIMIC J&J -A

## 2018-01-22 PROCEDURE — 85730 THROMBOPLASTIN TIME PARTIAL: CPT | Performed by: INTERNAL MEDICINE

## 2018-01-22 PROCEDURE — 84100 ASSAY OF PHOSPHORUS: CPT | Performed by: PHYSICIAN ASSISTANT

## 2018-01-22 PROCEDURE — 85610 PROTHROMBIN TIME: CPT | Performed by: PHYSICIAN ASSISTANT

## 2018-01-22 PROCEDURE — 85730 THROMBOPLASTIN TIME PARTIAL: CPT | Performed by: EMERGENCY MEDICINE

## 2018-01-22 PROCEDURE — 93306 TTE W/DOPPLER COMPLETE: CPT

## 2018-01-22 PROCEDURE — 74011250636 HC RX REV CODE- 250/636: Performed by: INTERNAL MEDICINE

## 2018-01-22 PROCEDURE — 74011000250 HC RX REV CODE- 250: Performed by: PHYSICIAN ASSISTANT

## 2018-01-22 PROCEDURE — 93005 ELECTROCARDIOGRAM TRACING: CPT

## 2018-01-22 PROCEDURE — 65660000004 HC RM CVT STEPDOWN

## 2018-01-22 RX ORDER — SODIUM CHLORIDE 0.9 % (FLUSH) 0.9 %
5-10 SYRINGE (ML) INJECTION AS NEEDED
Status: DISCONTINUED | OUTPATIENT
Start: 2018-01-22 | End: 2018-01-24 | Stop reason: HOSPADM

## 2018-01-22 RX ORDER — ACETAMINOPHEN 325 MG/1
650 TABLET ORAL
Status: DISCONTINUED | OUTPATIENT
Start: 2018-01-22 | End: 2018-01-24 | Stop reason: HOSPADM

## 2018-01-22 RX ORDER — LORAZEPAM 2 MG/ML
1 INJECTION INTRAMUSCULAR
Status: DISCONTINUED | OUTPATIENT
Start: 2018-01-22 | End: 2018-01-24 | Stop reason: HOSPADM

## 2018-01-22 RX ORDER — SODIUM CHLORIDE 0.9 % (FLUSH) 0.9 %
5-10 SYRINGE (ML) INJECTION EVERY 8 HOURS
Status: DISCONTINUED | OUTPATIENT
Start: 2018-01-22 | End: 2018-01-24 | Stop reason: HOSPADM

## 2018-01-22 RX ORDER — ONDANSETRON 2 MG/ML
4 INJECTION INTRAMUSCULAR; INTRAVENOUS
Status: DISCONTINUED | OUTPATIENT
Start: 2018-01-22 | End: 2018-01-24 | Stop reason: HOSPADM

## 2018-01-22 RX ORDER — HEPARIN SODIUM 1000 [USP'U]/ML
40-80 INJECTION, SOLUTION INTRAVENOUS; SUBCUTANEOUS ONCE
Status: COMPLETED | OUTPATIENT
Start: 2018-01-22 | End: 2018-01-22

## 2018-01-22 RX ORDER — HYDROCODONE BITARTRATE AND ACETAMINOPHEN 5; 325 MG/1; MG/1
1 TABLET ORAL
Status: DISCONTINUED | OUTPATIENT
Start: 2018-01-22 | End: 2018-01-24 | Stop reason: HOSPADM

## 2018-01-22 RX ORDER — BISACODYL 5 MG
5 TABLET, DELAYED RELEASE (ENTERIC COATED) ORAL DAILY PRN
Status: DISCONTINUED | OUTPATIENT
Start: 2018-01-22 | End: 2018-01-24 | Stop reason: HOSPADM

## 2018-01-22 RX ADMIN — HEPARIN SODIUM AND DEXTROSE 18 UNITS/KG/HR: 10000; 5 INJECTION INTRAVENOUS at 22:24

## 2018-01-22 RX ADMIN — FAMOTIDINE 20 MG: 10 INJECTION, SOLUTION INTRAVENOUS at 10:46

## 2018-01-22 RX ADMIN — Medication 10 ML: at 22:00

## 2018-01-22 RX ADMIN — HEPARIN SODIUM 4300 UNITS: 1000 INJECTION, SOLUTION INTRAVENOUS; SUBCUTANEOUS at 22:12

## 2018-01-22 NOTE — PROCEDURES
[facility]  [facility] ***    Name: Jory Hartmann  MRN: [id number]  : 14 Oct 1965  HIS Order #: [id number]  [address] [facility] Visit #: [id number]  Date: 2018    TYPE OF TEST: Peripheral Venous Testing    REASON FOR TEST  Limb swelling    Right Arm:-  Deep venous thrombosis:           No  Superficial venous thrombosis:    No    Left Arm:-  Deep venous thrombosis:           No  Superficial venous thrombosis:    No      INTERPRETATION/FINDINGS  Duplex images were obtained using 2-D gray scale, color flow, and  spectral Doppler analysis. Right arm :  1. No evidence of deep venous thrombosis detected in the veins  visualized. 2. Deep vein(s) visualized include the internal jugular, subclavian,  axillary and brachial veins. 3. No evidence of superficial thrombosis detected. 4. Superficial vein(s) visualized include the basilic (upper arm) and  cephalic (upper arm) veins. Left arm :  1. No evidence of deep venous thrombosis detected in the veins  visualized. 2. Deep vein(s) visualized include the internal jugular, subclavian,  axillary and brachial veins. 3. No evidence of superficial thrombosis detected. 4. Superficial vein(s) visualized include the basilic (upper arm) and  cephalic (upper arm) veins. ADDITIONAL COMMENTS    I have personally reviewed the data relevant to the interpretation of  this  study. TECHNOLOGIST: Eward Schilder. Ellyn ONTIVEROS  Signed: 2018 10:49 AM    PHYSICIAN: Gildardo Collet, D.O.   Signed: 2018 09:27 AM

## 2018-01-22 NOTE — ROUTINE PROCESS
TRANSFER - IN REPORT:    Verbal report received from Reg Christiansen RN(name) on Bony Mora Bone  being received from ED(unit) for routine progression of care      Report consisted of patients Situation, Background, Assessment and   Recommendations(SBAR). Information from the following report(s) SBAR, ED Summary, [facility] and Recent Results    was reviewed with the receiving nurse. Opportunity for questions and clarification was provided. Assessment completed upon patients arrival to unit and care assumed.

## 2018-01-22 NOTE — ED NOTES
Pt's central line dressing changed using sterile technique. 1/4 Steri-stripes applied to pt's central line for stabilzation d/t small amount leakage of blood from catheter. Dressing reinforced with tegaderm X 2, pt tolerated without any complications. Will continue to monitor site.

## 2018-01-22 NOTE — PROGRESS NOTES
Admit Date: 2018  Date of Service: 2018    Reason for follow-up: PE      Assessment:         Acute massive b/l PE with intermittent hypotension and hypoxia:  Right heart strain noted on imaging; Hx of prior saddle PE   - s/p TPA    Hydradenitis suppurativa: currently stable; completed short course of prednisone  Plan:   Close hemodynamic monitoring  Transition to stepdown- needs ongoing tele and O2 monitoring  Heparin drip; follow PTT  CBC, BMP in am  TTE pending for today  PVL UE pending  D/w nursing and Dr. Andrew Patterson    Current Antibtiocs:   None    Lines:   Central line    I spent 45 minutes with the patient in face-to-face consultation, of which greater than 50% was spent in counseling and coordination of care as described above. Case discussed with:  [x]Patient  [x]Family  [x]Nursing  []Case Management  DVT Prophylaxis:  []Lovenox  []Hep SQ  []SCDs  []Coumadin   [x]On Heparin gtt  I have independently examined the patient and reviewed all lab studies and imgaing as well as review of nursing notes and physican notes from the past 24 hours. The plan of care has been discussed with the patient and all questions are answered. Shady Roberts D.O. Pager [phone]      No Known Allergies        Subjective:      Pt seen and examined. Wife at bedside. He is feeling much better today. No SOB or wheezing at present. No chest pain. BP stable overnight. No headaches, chest pain, palpitations. No n/v/d.          Objective:        Visit Vitals    /76    Pulse 77    Temp 98.6 °F (37 °C)    Resp 16    Wt 108.2 kg (238 lb 9.6 oz)    SpO2 100%     Temp (24hrs), Av.2 °F (36.8 °C), Min:97.8 °F (36.6 °C), Max:98.6 °F (37 °C)        General:   awake alert and oriented, non-toxic   Skin:   no rashes or skin lesions noted on limited exam, dry and warm   HEENT:  No scleral icterus or pallor; oral mucosa moist, lips moist   Lymph Nodes:   not assessed today   Lungs:   non, labored; bilaterally clear to aspiration- no crackles wheezes rales or rhonchi   Heart:  RRR, s1 and s2; no murmurs rubs or gallops; no edema, + pedal pulses   Abdomen:  soft, non-distended, active bowel sounds, non-tender   Genitourinary:  deferred   Extremities:   average muscle tone; no contractures, no joint effusions   Neurologic:  No gross focal motor or sensory abnormalities; CN 2-12 intact; Follows commands. Psychiatric:   appropriate and interactive.        Labs: Results:   Chemistry Recent Labs      18   0340  18   1407   GLU  86  97   NA  141  143   K  3.4*  3.4*   CL  106  107   CO2  28  28   BUN  13  13   CREA  0.77  0.98   CA  7.9*  8.8   AGAP  7  8   BUCR  17  13   AP   --   80   TP   --   7.8   ALB   --   3.1*   GLOB   --   4.7*   AGRAT   --   0.7*      CBC w/Diff Recent Labs      18   1407   WBC  10.3   RBC  5.20   HGB  14.0   HCT  41.8   PLT  158   GRANS  64   LYMPH  26   EOS  2        No results found for: SDES No results found for: CULT       Imagin/22 UE PVL: pending

## 2018-01-22 NOTE — PROGRESS NOTES
[facility] Follow-up    Evaluated patient in ED. TPA completed. Patient states his sob and chest pain are both improving. Awake, alert, conversing without difficulty. BP noted to be in 140-150 range. Oxygenating well on 3L NC. Tachycardia has improved - now down to 110-115 from 130s.  + oozing from left armpit (has hx of hydradenitis suppurativa). Left groin with HS as well however no oozing. Pt states he takes prednisone prn for flares of HS - today was his 5th day taking prednisone. Lungs with some rhonchi, no wheezing. Massive PE with heavy burden and hemodynamic instability, s/p TPA  · PTT now  · Start heparin infusion -no bolus  · Monitor closely for active bleeding. Apply direct pressure to site of oozing - left armpit with gauze given mild serous oozing. · Monitor mental status closely (similar to stroke patients receiving TPA)  · Monitor BP - will consider low dose BB if BP persistently elevated. Discussed with RN at bedside.     January-Debbie DELGADILLO PA-C  9:30 PM

## 2018-01-22 NOTE — ED NOTES
Bedside report given to Honey Torres RN. Pt producing urine in condom cath at this time, TPA is still infusing at this time.

## 2018-01-22 NOTE — PROGRESS NOTES
*ATTENTION: This note has been created by a medical student for educational purposes only. Please do not refer to the content of this note for clinical decision-making, billing, or other purposes. Please see attending physicians note to obtain clinical information on this patient. *      [facility]  ICU Progress Note      Name: Haja Dos Santos   : 1965   MRN: [id number]   Date: 2018 8:40 PM       Subjective:    Haja Dos Santos is a 46 y.o. male with past medical history of PE 5 years ago and HTN who presented to the ED complaining of SOB. Patient states for the last two weeks he has been experiencing HERNANDEZ, particularly in the cold, for the last several weeks. This morning he states that he had an acute onset of SOB while putting on pants. He states that he had became lightheaded and had an episode of LOC. He admits to associated pleuritic chest pain and nonproductive cough. He states 17 he traveled to [place] by car, and several days later noted left lower leg pain, no swelling or erythema. Patient admits to an episode of diarrhea yesterday, no other symptoms noted. No fever, chills, N/V, headache. Patient quit smoking in . Patient's previous PE treated with tPA acutely & coumadin x1 year. In the ED tPA was administered, after which patient's SOB resolved, and hemodynamic status improved.      Review of systems:            Constitutional: See HPI  Ears, nose, mouth, throat, and face: -rhinorrhea, -sore throat  Respiratory: See HPI  Cardiovascular: See HPI  Gastrointestinal: -abdominal pain, -constipation  Genitourinary:-dysuria, -hematuria    Medication Review:  · Pressors - none  · Sedation - none  · Antibiotics - none  · Pain - none  · GI/ DVT - tPA & heparin  · Others (other gtts)    Safety Bundles: VAP Bundle/ CAUTI/ Severe Sepsis Protocol/ Electrolyte Replacement Protocol    Vital Signs:    Visit Vitals    BP (!) 134/96    Pulse (!) 114    Temp 97.8 °F (36.6 °C)    Resp 16    Wt 108.2 kg (238 lb 9.6 oz)    SpO2 100%               Temp (24hrs), Av.8 °F (36.6 °C), Min:97.8 °F (36.6 °C), Max:97.8 °F (36.6 °C)       Physical Exam:    General: Resting comfortably in no acute distress  HEENT:  Anicteric sclerae; pink palpebral conjunctivae; mucosa moist  Resp:  Symmetrical chest expansion, no accessory muscle use; good airway entry; no rales/ wheezing/ rhonchi noted  CV:  S1, S2 present; tachycardic rate and rhythm  GI:  Abdomen soft, non-tender; (+) active bowel sounds  Extremities:  +2 pulses on all extremities; no edema/ cyanosis/ clubbing noted  Skin:  Warm; no rashes/ lesions noted  Neurologic:  Non-focal  Devices:  No NGT/OGT, Central line/ PICC, ETT/tracheostomy, chest tube      DATA:     Current Facility-Administered Medications   Medication Dose Route Frequency    heparin 25,000 units in D5W 250 ml infusion  18-36 Units/kg/hr (Order-Specific) IntraVENous TITRATE     No current outpatient prescriptions on file. Labs: Results:       Chemistry Recent Labs      18   1407   GLU  97   NA  143   K  3.4*   CL  107   CO2  28   BUN  13   CREA  0.98   CA  8.8   AGAP  8   BUCR  13   AP  80   TP  7.8   ALB  3.1*   GLOB  4.7*   AGRAT  0.7*      CBC w/Diff Recent Labs      18   1407   WBC  10.3   RBC  5.20   HGB  14.0   HCT  41.8   PLT  158   GRANS  64   LYMPH  26   EOS  2      Coagulation Recent Labs      18   1407   PTP  12.8   INR  1.0   APTT  23.0       Liver Enzymes Recent Labs      18   1407   TP  7.8   ALB  3.1*   AP  80   SGOT  39*      ABG No results found for: PH, PHI, PCO2, PCO2I, PO2, PO2I, HCO3, HCO3I, FIO2, FIO2I   Microbiology No results for input(s): CULT in the last 72 hours. Telemetry: [x]Sinus []A-flutter []Paced    []A-fib []Multiple PVCs                    Imaging:      CT Results  (Last 48 hours)               18 1523  CTA CHEST W OR W WO CONT Final result    Impression:  Impression:         1.  Acute pulmonary embolism, very large burden bilaterally extending to the   level of the central left and right pulmonary arteries. Evidence for right heart   strain as detailed above. I have telephoned a wet reading regarding this critical result directly to Dr. Chisholm Friday prior to dictation of this report at 3:45 PM on 1/21/2018.       2.  Small subpleural regions of groundglass attenuation bilaterally, suspicious   for small areas of hemorrhage due to ischemia or infarct in the setting of acute   pulmonary embolism as discussed above. 3.  Borderline enlarged mediastinal and upper abdominal lymph nodes,   approximately 1 cm, nonspecific. 4.  Right thyroid lobe is mildly enlarged compared to the left as noted above. Narrative:  CTA OF THE CHEST, WITH CORONAL AND SAGITTAL REFORMATTED MIP IMAGES, PULMONARY   EMBOLISM PROTOCOL       CPT CODE: 13374       INDICATION: Syncopal episode. Reported history of sagittal pulmonary embolism   treated elsewhere in the past.  Clinical concern for acute pulmonary embolism. COMPARISON: No priors in PACS       TECHNIQUE: With IV administration of 80 ml Isovue-370, axial CT images through   the thorax were obtained using helical technique following a pulmonary embolism   protocol. In order more optimally to evaluate the pulmonary arterial tree in   multiplanar CT angiographic fashion, coronal and sagittal reformation maximum   intensity projection (MIP) images were also performed. All CT scans at this   facility are performed using dose optimization technique as appropriate to the   performed exam, to include automated exposure control, adjustment of the mA   and/or kV according to patient's size (Including appropriate matching for   site-specific examinations), or use of iterative reconstruction technique. FINDINGS:       Adequate contrast bolus.  Extensive large occlusive and nearly occlusive filling   defects consistent with acute pulmonary embolism in the upper and lower lobes   bilaterally extending to the level of the central right and left pulmonary   arteries consistent with a very large burden of acute pulmonary embolism. There   are findings for right heart strain. The main pulmonary artery measures   approximately 3.4 cm in caliber. There is some reflux of contrast inferiorly   into the IVC. There is bowing of the interventricular septum toward the left   ventricle. The right atrium and right ventricle are dilated compared to the   left. Patchy groundglass attenuation in the subpleural right midlung laterally   anterior to the major fissure near the level of the minor fissure. Additional   patchy groundglass attenuation in the subpleural superior segment left lower   lobe. In the setting of acute pulmonary embolism, findings are suspicious for   hemorrhage due to pulmonary ischemia/infarct. CT follow-up might be helpful   after therapy to assess for resolution. No evidence of pleural or significant pericardial effusion is seen. Multiple small to borderline sized lymph nodes scattered in the mediastinum   bilaterally, up to approximately 1 cm in the superior mediastinum bilaterally. The right thyroid lobe appears enlarged compared to the left without a   measurable clearly definable thyroid nodule by CT, which could be better   assessed on follow-up thyroid ultrasound as clinically warranted. The thoracic aorta enhances normally. Cardiac motion artifacts noted. Lymph nodes noted in the region of the gastrohepatic ligament, the larger   borderline in size for lymphadenopathy, 1 cm in diameter, nonspecific (reference   CT axial series 2 images 248-251). On review of bone windows, no acute fractures or destructive bone lesions are   seen. 01/21/18 1523  CT HEAD WO CONT Final result    Impression:  Impression:        No CT evidence of acute intracranial pathology.                 Narrative:  NONCONTRAST HEAD CT       CPT CODE: 38991       INDICATION: Above. Syncopal episode       COMPARISON: None       TECHNIQUE: Serial axial CT images through the brain were obtained without   administration of intravenous contrast. Additional coronal and sagittal   reformation images were also performed. All CT scans at this facility are   performed using dose optimization technique as appropriate to the performed   exam, to include automated exposure control, adjustment of the mA and/or kV   according to patient's size (Including appropriate matching for site-specific   examinations), or use of iterative reconstruction technique. FINDINGS:       The sulci and ventricles are within normal limits in size and configuration. There is no evidence of acute intracranial hemorrhage. No edema, midline shift or other mass effect is seen. No mass lesion   identified. No evidence of acute ischemic stroke/ cerebrovascular accident (CVA) is   identified. Head CT is often insensitive early to acute ischemic stroke. The visualized portions of the paranasal sinuses demonstrate no significant   mucosal pathology. The mastoid air cells are aerated  The calvarium appears   intact. IMPRESSION:   · Bilateral pulmonary embolism: confirmed on CTA chest. Evidence of right heart strain noted on CTA. Given tPA in the ED after which symptoms improved greatly. Hemodynamically stable at this time. · Hx of saddle PE: tx with tPA acutely and on coumadin x1 year        PLAN:   · Resp -  Supplemental O2 via nasal cannula as needed. Initiate heparin drip once PTT has been resulted. Patient will likely be on lifelong anticoagulation. · ID - No issues, no signs of infection noted  · CVS - monitor hemodynamic status closely. · Heme/onc -  Monitor PTT to ensure patient has the appropriate level of anticoagulation. Evaluate for any signs of active bleeding.   · Metabolic - Monitor electrolytes for any derangements  · Renal - No issues  · Endocrine - No issues  · Neuro/ Pain/ Sedation - monitor for any headache or changes in neurologic status  · GI - ice chips for the next few hours, may allow for patient to eat if he continues to remain hemodynamically stable.   · Prophylaxis - DVT, GI          Jono ZALDIVAR

## 2018-01-22 NOTE — ED NOTES
TPA completed. No bleeding at groin area,  Small oozing left arm pit.   Guaze placed and arm lowered for pressure

## 2018-01-22 NOTE — H&P
Hospitalist Admission History and Physical    NAME:  Natali Duarte   :   1965   MRN:   [id number]     PCP:  PROVIDER UNKNOWN  Date/Time:  2018 7:28 PM  Subjective:   CHIEF COMPLAINT:    Shortness of breath    HISTORY OF PRESENT ILLNESS:     Mr. Luis Arevalo is a 46 y.o. white male with history of saddle PE off anticoagulation who presented to the ER with SOB, dizziness, and an episode of syncope. Pt states he took a shower and got dressed when the SOB began and then had a syncopal episode. Pt reports having a long road trip to  [place] 3 weeks ago. Wife states pt was complaining of right leg pain last week but pt states pain was different from saddle PE he had previously. Pt denies hx of bleeding. Denies current use of blood thinners. In the ED, CTA performed which revealed bilateral acute pulmonary emboli. Since presentation, he has become hypotensive. Central line placed and TPA to be performed. PMHx:   PE    No past surgical history on file. Social History   Substance Use Topics    Smoking status: Not on file    Smokeless tobacco: Not on file    Alcohol use Not on file        No family history on file.      No Known Allergies     None       REVIEW OF SYSTEMS:     [] Unable to obtain  ROS due to  []mental status change  []sedated   []intubated   [x]Total of 12 systems reviewed as follows:  Constitutional:  negative fever, negative chills, negative weight loss  Eyes:   negative visual changes  ENT:   negative sore throat, tongue or lip swelling  Respiratory:  +cough, +dyspnea, + prior PE  Cards:   negative for chest pain, palpitations, lower extremity edema  GI:   negative for nausea, vomiting, diarrhea, and abdominal pain  Genitourinary: negative for frequency, dysuria  Integument:  negative for rash and pruritus  Hematologic:  negative for easy bruising and gum/nose bleeding  Musculoskel: negative for myalgias,  back pain and muscle weakness; + recent leg pain  Neurological:  negative for headaches, dizziness, vertigo + syncope prior to admission  Behavl/Psych:  negative for feelings of anxiety, depression         Objective:   VITALS:    Visit Vitals    /85    Pulse (!) 123    Temp 97.8 °F (36.6 °C)    Resp 22    Wt 108.2 kg (238 lb 9.6 oz)    SpO2 100%     Temp (24hrs), Av.8 °F (36.6 °C), Min:97.8 °F (36.6 °C), Max:97.8 °F (36.6 °C)      PHYSICAL EXAM:   General:    Alert, cooperative, no distress, appears stated age. Head:   Normocephalic, without obvious abnormality, atraumatic. Eyes:   Conjunctivae clear, anicteric sclerae. Pupils are equal  Nose:  Nares normal. No drainage or sinus tenderness. Throat:    Lips, mucosa, and tongue normal.  No Thrush  Neck:  Supple, symmetrical,  no adenopathy, thyroid: non tender    no carotid bruit and no JVD. Back:    Symmetric,  No CVA tenderness. Lungs:   Clear to auscultation bilaterally. No Wheezing or Rhonchi. No rales. Chest wall:  No tenderness or deformity. No Accessory muscle use. Heart:   Tachycardic, occasional ectopy,  no murmur, rub or gallop. Abdomen:   Soft, non-tender. Not distended. Bowel sounds normal. No masses  Extremities: Extremities normal, atraumatic, No cyanosis. No edema. No clubbing  Skin:     Texture, turgor normal. No rashes or lesions. Not Jaundiced  Lymph nodes: Cervical, supraclavicular normal.  Psych:  Good insight. Not depressed. Not anxious or agitated. Neurologic: EOMs intact. No facial asymmetry. No aphasia or slurred speech. Normal   strength, Alert and oriented X 3. LAB DATA REVIEWED:    No components found for: [place]  Recent Labs      18   1407   NA  143   K  3.4*   CL  107   CO2  28   BUN  13   CREA  0.98   GLU  97   CA  8.8   ALB  3.1*   WBC  10.3   HGB  14.0   HCT  41.8   PLT  158     Results for BONE, Vilma Murphy (MRN [id number]) as of 2018 19:36   Ref.  Range 2018 14:07   INR Latest Ref Range: 0.8 - 1.2   1.0   Prothrombin time Latest Ref Range: 11.5 - 15.2 sec 12.8 aPTT Latest Ref Range: 23.0 - 36.4 SEC 23.0   Results for BONE, Carolyn Hill (MRN [id number]) as of 1/21/2018 19:36   Ref. Range 1/21/2018 14:07   ALT (SGPT) Latest Ref Range: 16 - 61 U/L 30   AST Latest Ref Range: 15 - 37 U/L 39 (H)   Alk. phosphatase Latest Ref Range: 45 - 117 U/L 80   CK Latest Ref Range: 39 - 308 U/L 37 (L)   CK-MB Index Latest Ref Range: 0.0 - 4.0 % CALCULATION NOT P... CK - MB Latest Ref Range: <3.6 ng/ml <1.0   Troponin-I, Qt. Latest Ref Range: 0.0 - 0.045 NG/ML 0.11 (H)   NT pro-BNP Latest Ref Range: 0 - 900 PG/       IMAGING RESULTS:     1/21 CT head :No CT evidence of acute intracranial pathology    1/21 CTA chest: 1. Acute pulmonary embolism, very large burden bilaterally extending to the  level of the central left and right pulmonary arteries. Evidence for right heart  strain as detailed above.     I have telephoned a wet reading regarding this critical result directly to Dr. Daniel Barrera prior to dictation of this report at 3:45 PM on 1/21/2018.     2.  Small subpleural regions of groundglass attenuation bilaterally, suspicious  for small areas of hemorrhage due to ischemia or infarct in the setting of acute  pulmonary embolism as discussed above.     3.  Borderline enlarged mediastinal and upper abdominal lymph nodes,  approximately 1 cm, nonspecific.     4.  Right thyroid lobe is mildly enlarged compared to the left as noted above. Assessment/Plan:   Acute massive b/l PE with intermittent hypotension and hypoxia:  Right heart strain noted on imaging; Hx of prior saddle PE  ___________________________________________________  PLAN:    Risk of deterioration:  []Low    []Moderate  [x]High    Intensivist and PA in room evaluating patient for transfer to ICU  No TPA planned at this time per ICU attending.   Close hemodynamic monitoring; management as per ICU team  Heparin drip; follow PTT  CBC, BMP in am      Disposition:  []Home w/ Family   []HH PT,OT,RN   []SNF/LTC []SAH/Rehab    Discussed Code Status:    [x]Full Code      []DNR     ___________________________________________________    Care Plan discussed with:    [x]Patient   [x]Family    []ED Care Manager  [x]ED Doc   [x]Specialist :    Total Time Coordinating Admission:   90   minutes    []Total Critical Care Time:     ___________________________________________________  Admitting Physician: Lisa Castellanos MD

## 2018-01-22 NOTE — ED NOTES
No increase of bleeding at right iv site,  Right femoral cvp site or left ac iv site. Pt has small hematoma lateral to right iv site.

## 2018-01-22 NOTE — PROGRESS NOTES
[facility]  ICU Progress Note      Name: Gwyn Gann   : 1965   MRN: [id number]   Date: 2018 8:40 PM       Subjective:    Gwyn Gann is a 46 y.o. male with past medical history of PE 5 years ago (previously received TPA and was on coumadin anticoagulation for a year), and HTN, found to have heavy burden of clot/ PE. Pt received TPA for hemodynamic instability and restarted on heparin infusion. 18  Pt had improvement in sob and chest pain overnight following TPA. There was some mild oozing noted from the femoral central line and left armpit hydradenitis however controlled with direct pressure. This morning, pt was noted to have some mild facial swelling although he denies any difficulty breathing nor swallowing. Pt is hemodynamically stable -no pressor need. HR improved and no longer tachycardic. He is also oxygenating well on NC.       Review of systems:            Constitutional: See HPI  Ears, nose, mouth, throat, and face: -rhinorrhea, -sore throat  Respiratory: See HPI  Cardiovascular: See HPI  Gastrointestinal: -abdominal pain, -constipation  Genitourinary:-dysuria, -hematuria    Medication Review:  · Pressors - none  · Sedation - none  · Antibiotics - none  · Pain - none  · GI/ DVT - famotidine  · Others (other gtts) - heparin gtt for PE      Vital Signs:    Visit Vitals    BP (!) 151/99    Pulse 87    Temp 97.8 °F (36.6 °C)    Resp 20    Wt 108.2 kg (238 lb 9.6 oz)    SpO2 100%               Temp (24hrs), Av.8 °F (36.6 °C), Min:97.8 °F (36.6 °C), Max:97.8 °F (36.6 °C)         Physical Exam:    General: Resting comfortably in no acute distress  HEENT:  Anicteric sclerae; pink palpebral conjunctivae; oral mucosa moist; + mild facial swelling  Resp:  Symmetrical chest expansion, no accessory muscle use; good airway entry; some b/l rhonchi   CV:  S1, S2 present; regular rate and rhythm  GI:  Abdomen soft, non-tender; (+) active bowel sounds  Extremities: +2 pulses on all extremities; no edema/ cyanosis/ clubbing noted  Skin:  Warm; no rashes/ lesions noted  Neurologic:  Non-focal  Devices:   R femoral central line -1/21/18        DATA:     Current Facility-Administered Medications   Medication Dose Route Frequency    sodium chloride (NS) flush 5-10 mL  5-10 mL IntraVENous Q8H    sodium chloride (NS) flush 5-10 mL  5-10 mL IntraVENous PRN    acetaminophen (TYLENOL) tablet 650 mg  650 mg Oral Q4H PRN    HYDROcodone-acetaminophen (NORCO) 5-325 mg per tablet 1 Tab  1 Tab Oral Q4H PRN    ondansetron (ZOFRAN) injection 4 mg  4 mg IntraVENous Q4H PRN    bisacodyl (DULCOLAX) tablet 5 mg  5 mg Oral DAILY PRN    LORazepam (ATIVAN) injection 1 mg  1 mg IntraVENous Q6H PRN    famotidine (PF) (PEPCID) 20 mg in sodium chloride 0.9 % 10 mL injection  20 mg IntraVENous Q12H    heparin 25,000 units in D5W 250 ml infusion  18-36 Units/kg/hr (Order-Specific) IntraVENous TITRATE     No current outpatient prescriptions on file. Labs: Results:       Chemistry Recent Labs      01/22/18   0340  01/21/18   1407   GLU  86  97   NA  141  143   K  3.4*  3.4*   CL  106  107   CO2  28  28   BUN  13  13   CREA  0.77  0.98   CA  7.9*  8.8   AGAP  7  8   BUCR  17  13   AP   --   80   TP   --   7.8   ALB   --   3.1*   GLOB   --   4.7*   AGRAT   --   0.7*      CBC w/Diff Recent Labs      01/21/18   1407   WBC  10.3   RBC  5.20   HGB  14.0   HCT  41.8   PLT  158   GRANS  64   LYMPH  26   EOS  2      Coagulation Recent Labs      01/22/18   0340  01/21/18   1950  01/21/18   1407   PTP  15.5*   --   12.8   INR  1.3*   --   1.0   APTT  >180.0*  41.0*  23.0       Liver Enzymes Recent Labs      01/21/18   1407   TP  7.8   ALB  3.1*   AP  80   SGOT  39*      ABG No results found for: PH, PHI, PCO2, PCO2I, PO2, PO2I, HCO3, HCO3I, FIO2, FIO2I   Microbiology No results for input(s): CULT in the last 72 hours.        Telemetry: [x]Sinus []A-flutter []Paced    []A-fib []Multiple PVCs Imaging:      CT Results  (Last 48 hours)               01/21/18 1523  CTA CHEST W OR W WO CONT Final result    Impression:  Impression:         1. Acute pulmonary embolism, very large burden bilaterally extending to the   level of the central left and right pulmonary arteries. Evidence for right heart   strain as detailed above. I have telephoned a wet reading regarding this critical result directly to Dr. Maria R Ye prior to dictation of this report at 3:45 PM on 1/21/2018.       2.  Small subpleural regions of groundglass attenuation bilaterally, suspicious   for small areas of hemorrhage due to ischemia or infarct in the setting of acute   pulmonary embolism as discussed above. 3.  Borderline enlarged mediastinal and upper abdominal lymph nodes,   approximately 1 cm, nonspecific. 4.  Right thyroid lobe is mildly enlarged compared to the left as noted above. Narrative:  CTA OF THE CHEST, WITH CORONAL AND SAGITTAL REFORMATTED MIP IMAGES, PULMONARY   EMBOLISM PROTOCOL       CPT CODE: 62198       INDICATION: Syncopal episode. Reported history of sagittal pulmonary embolism   treated elsewhere in the past.  Clinical concern for acute pulmonary embolism. COMPARISON: No priors in PACS       TECHNIQUE: With IV administration of 80 ml Isovue-370, axial CT images through   the thorax were obtained using helical technique following a pulmonary embolism   protocol. In order more optimally to evaluate the pulmonary arterial tree in   multiplanar CT angiographic fashion, coronal and sagittal reformation maximum   intensity projection (MIP) images were also performed.  All CT scans at this   facility are performed using dose optimization technique as appropriate to the   performed exam, to include automated exposure control, adjustment of the mA   and/or kV according to patient's size (Including appropriate matching for   site-specific examinations), or use of iterative reconstruction technique. FINDINGS:       Adequate contrast bolus. Extensive large occlusive and nearly occlusive filling   defects consistent with acute pulmonary embolism in the upper and lower lobes   bilaterally extending to the level of the central right and left pulmonary   arteries consistent with a very large burden of acute pulmonary embolism. There   are findings for right heart strain. The main pulmonary artery measures   approximately 3.4 cm in caliber. There is some reflux of contrast inferiorly   into the IVC. There is bowing of the interventricular septum toward the left   ventricle. The right atrium and right ventricle are dilated compared to the   left. Patchy groundglass attenuation in the subpleural right midlung laterally   anterior to the major fissure near the level of the minor fissure. Additional   patchy groundglass attenuation in the subpleural superior segment left lower   lobe. In the setting of acute pulmonary embolism, findings are suspicious for   hemorrhage due to pulmonary ischemia/infarct. CT follow-up might be helpful   after therapy to assess for resolution. No evidence of pleural or significant pericardial effusion is seen. Multiple small to borderline sized lymph nodes scattered in the mediastinum   bilaterally, up to approximately 1 cm in the superior mediastinum bilaterally. The right thyroid lobe appears enlarged compared to the left without a   measurable clearly definable thyroid nodule by CT, which could be better   assessed on follow-up thyroid ultrasound as clinically warranted. The thoracic aorta enhances normally. Cardiac motion artifacts noted. Lymph nodes noted in the region of the gastrohepatic ligament, the larger   borderline in size for lymphadenopathy, 1 cm in diameter, nonspecific (reference   CT axial series 2 images 248-251). On review of bone windows, no acute fractures or destructive bone lesions are   seen. 01/21/18 1523  CT HEAD WO CONT Final result    Impression:  Impression:        No CT evidence of acute intracranial pathology. Narrative:  NONCONTRAST HEAD CT       CPT CODE: 36996       INDICATION: Above. Syncopal episode       COMPARISON: None       TECHNIQUE: Serial axial CT images through the brain were obtained without   administration of intravenous contrast. Additional coronal and sagittal   reformation images were also performed. All CT scans at this facility are   performed using dose optimization technique as appropriate to the performed   exam, to include automated exposure control, adjustment of the mA and/or kV   according to patient's size (Including appropriate matching for site-specific   examinations), or use of iterative reconstruction technique. FINDINGS:       The sulci and ventricles are within normal limits in size and configuration. There is no evidence of acute intracranial hemorrhage. No edema, midline shift or other mass effect is seen. No mass lesion   identified. No evidence of acute ischemic stroke/ cerebrovascular accident (CVA) is   identified. Head CT is often insensitive early to acute ischemic stroke. The visualized portions of the paranasal sinuses demonstrate no significant   mucosal pathology. The mastoid air cells are aerated  The calvarium appears   intact. IMPRESSION:   · Bilateral pulmonary embolism with heavy burden of clot, s/p TPA and currently on heparin infusion - improved sob and chest pain    · Hx prior PE with tPA treatment acutely and on coumadin x1 year (about 5 years ago)        PLAN:   · Resp - supplemental O2 via nasal cannula as needed. Continue heparin infusion for PE. Pt will need to be on lifelong anticoagulation given recurrence of PE.   · ID - No issues, no signs of infection noted  · CVS - monitor hemodynamic status closely. Awaiting echo.  Obtain U/S b/l UEs   · Heme/onc - monitor PTT to ensure patient has the appropriate level of anticoagulation.  Evaluate for any signs of active bleeding -apply direct pressure if bleeding noted  · Metabolic - Monitor electrolytes for any derangements and replace as needed - K 40 mEq PO  · Renal - No issues  · Endocrine - No issues  · Neuro/ Pain/ Sedation - monitor for any headache or changes in neurologic status  · GI - cardiac diet  · Avoid IV sticks 12-hours post-TPA  · Prophylaxis - GI          January-SERENITY TristanC

## 2018-01-23 ENCOUNTER — APPOINTMENT (OUTPATIENT)
Dept: CT IMAGING | Age: 53
DRG: 252 | End: 2018-01-23
Attending: INTERNAL MEDICINE
Payer: OTHER GOVERNMENT

## 2018-01-23 LAB
ANION GAP SERPL CALC-SCNC: 6 MMOL/L (ref 3–18)
APTT PPP: 141.9 SEC (ref 23–36.4)
APTT PPP: 28.4 SEC (ref 23–36.4)
BUN SERPL-MCNC: 8 MG/DL (ref 7–18)
BUN/CREAT SERPL: 10 (ref 12–20)
CALCIUM SERPL-MCNC: 8.4 MG/DL (ref 8.5–10.1)
CHLORIDE SERPL-SCNC: 106 MMOL/L (ref 100–108)
CO2 SERPL-SCNC: 31 MMOL/L (ref 21–32)
CREAT SERPL-MCNC: 0.84 MG/DL (ref 0.6–1.3)
GLUCOSE SERPL-MCNC: 89 MG/DL (ref 74–99)
INR PPP: 1.1 (ref 0.8–1.2)
POTASSIUM SERPL-SCNC: 3.5 MMOL/L (ref 3.5–5.5)
PROTHROMBIN TIME: 13.7 SEC (ref 11.5–15.2)
PSA SERPL-MCNC: 0.9 NG/ML (ref 0–4)
SODIUM SERPL-SCNC: 143 MMOL/L (ref 136–145)

## 2018-01-23 PROCEDURE — 74011000250 HC RX REV CODE- 250: Performed by: PHYSICIAN ASSISTANT

## 2018-01-23 PROCEDURE — 85610 PROTHROMBIN TIME: CPT | Performed by: INTERNAL MEDICINE

## 2018-01-23 PROCEDURE — 81241 F5 GENE: CPT | Performed by: INTERNAL MEDICINE

## 2018-01-23 PROCEDURE — 85730 THROMBOPLASTIN TIME PARTIAL: CPT | Performed by: INTERNAL MEDICINE

## 2018-01-23 PROCEDURE — 86148 ANTI-PHOSPHOLIPID ANTIBODY: CPT | Performed by: INTERNAL MEDICINE

## 2018-01-23 PROCEDURE — 93970 EXTREMITY STUDY: CPT

## 2018-01-23 PROCEDURE — 65660000004 HC RM CVT STEPDOWN

## 2018-01-23 PROCEDURE — 77010033678 HC OXYGEN DAILY

## 2018-01-23 PROCEDURE — 84153 ASSAY OF PSA TOTAL: CPT | Performed by: INTERNAL MEDICINE

## 2018-01-23 PROCEDURE — 80048 BASIC METABOLIC PNL TOTAL CA: CPT | Performed by: INTERNAL MEDICINE

## 2018-01-23 PROCEDURE — 81240 F2 GENE: CPT | Performed by: INTERNAL MEDICINE

## 2018-01-23 PROCEDURE — 74011636320 HC RX REV CODE- 636/320: Performed by: INTERNAL MEDICINE

## 2018-01-23 PROCEDURE — 74011250637 HC RX REV CODE- 250/637: Performed by: INTERNAL MEDICINE

## 2018-01-23 PROCEDURE — 36415 COLL VENOUS BLD VENIPUNCTURE: CPT | Performed by: INTERNAL MEDICINE

## 2018-01-23 PROCEDURE — 74177 CT ABD & PELVIS W/CONTRAST: CPT

## 2018-01-23 PROCEDURE — 85613 RUSSELL VIPER VENOM DILUTED: CPT | Performed by: INTERNAL MEDICINE

## 2018-01-23 RX ORDER — ATROPINE SULFATE 0.1 MG/ML
INJECTION INTRAVENOUS
Status: DISPENSED
Start: 2018-01-23 | End: 2018-01-24

## 2018-01-23 RX ORDER — HYDROCHLOROTHIAZIDE 25 MG/1
25 TABLET ORAL DAILY
Status: DISCONTINUED | OUTPATIENT
Start: 2018-01-23 | End: 2018-01-24 | Stop reason: HOSPADM

## 2018-01-23 RX ORDER — FAMOTIDINE 40 MG/5ML
20 POWDER, FOR SUSPENSION ORAL 2 TIMES DAILY
Status: DISCONTINUED | OUTPATIENT
Start: 2018-01-23 | End: 2018-01-24 | Stop reason: HOSPADM

## 2018-01-23 RX ADMIN — Medication 10 ML: at 23:41

## 2018-01-23 RX ADMIN — APIXABAN 10 MG: 5 TABLET, FILM COATED ORAL at 13:48

## 2018-01-23 RX ADMIN — APIXABAN 10 MG: 5 TABLET, FILM COATED ORAL at 23:38

## 2018-01-23 RX ADMIN — HYDROCHLOROTHIAZIDE 25 MG: 25 TABLET ORAL at 23:39

## 2018-01-23 RX ADMIN — DIATRIZOATE MEGLUMINE AND DIATRIZOATE SODIUM 30 ML: 600; 100 SOLUTION ORAL; RECTAL at 13:51

## 2018-01-23 RX ADMIN — IOPAMIDOL 100 ML: 612 INJECTION, SOLUTION INTRAVENOUS at 17:15

## 2018-01-23 RX ADMIN — FAMOTIDINE 20 MG: 40 POWDER, FOR SUSPENSION ORAL at 13:49

## 2018-01-23 RX ADMIN — FAMOTIDINE 20 MG: 40 POWDER, FOR SUSPENSION ORAL at 18:36

## 2018-01-23 RX ADMIN — FAMOTIDINE 20 MG: 10 INJECTION, SOLUTION INTRAVENOUS at 00:52

## 2018-01-23 RX ADMIN — Medication 10 ML: at 14:00

## 2018-01-23 NOTE — ROUTINE PROCESS
Bedside and Verbal shift change report given to Nohemy Matthews  (oncoming nurse) by Pepe Rich RN  (offgoing nurse). Report included the following information SBAR, ED Summary, MAR and Recent Results.

## 2018-01-23 NOTE — PROGRESS NOTES
Admit Date: 1/21/2018  Date of Service: 1/23/2018    Reason for follow-up: PE      Assessment:         Acute massive b/l PE with intermittent hypotension and hypoxia:  Right heart strain noted on imaging; Hx of prior saddle PE and DVT   - s/p TPA, on heparin drip    Right posterior tibial and peroneal vein thrombosis. Hypercoagulability w/u in progress    Hydradenitis suppurativa: currently stable; completed short course of prednisone  Plan:   Close hemodynamic monitoring  Heparin drip; follow PTT   -  Recommned life-long anticoagulation: Switch to Eliquis 10mg PO BID x 7 days, then 5 mg PO BID.  CBC, BMP in am  Appreciate Pulmonary input   - CT abd/pelvis, Antiphospholipid Abs, Fact V leiden, Lupus anticoagulant, PT gene mutation   - AT deficiency, Fact 8 levels, Protein C and S to be done as OP. Current Antibtiocs:   None    Lines:   Central line    I spent 45 minutes with the patient in face-to-face consultation, of which greater than 50% was spent in counseling and coordination of care as described above. Case discussed with:  [x]Patient  [x]Family  [x]Nursing  []Case Management  DVT Prophylaxis:  []Lovenox  []Hep SQ  []SCDs  []Coumadin   [x]On Heparin gtt  I have independently examined the patient and reviewed all lab studies and imgaing as well as review of nursing notes and physican notes from the past 24 hours. The plan of care has been discussed with the patient and all questions are answered. Jose Alberto Rabago D.O. Pager [phone]      No Known Allergies        Subjective:      Pt seen and examined. Wife at bedside. He is feeling much better today. No SOB or wheezing at present. No chest pain. BP stable overnight. No headaches, chest pain, palpitations. No n/v/d. Had some coughing earlier today.     Objective:        Visit Vitals    BP (!) 175/108 (BP 1 Location: Right arm, BP Patient Position: At rest)    Pulse 85    Temp 97.5 °F (36.4 °C)    Resp 20    Ht 6' 1\" (1.854 m)    Wt 108.2 kg (238 lb 9.6 oz)    SpO2 96%     Temp (24hrs), Av.5 °F (36.9 °C), Min:97.5 °F (36.4 °C), Max:99.2 °F (37.3 °C)        General:   awake alert and oriented, non-toxic   Skin:   no rashes or skin lesions noted on limited exam, dry and warm   HEENT:  No scleral icterus or pallor; oral mucosa moist, lips moist   Lymph Nodes:   not assessed today   Lungs:   non, labored; bilaterally clear to aspiration- no crackles wheezes rales or rhonchi   Heart:  RRR, s1 and s2; no murmurs rubs or gallops; no edema, + pedal pulses   Abdomen:  soft, non-distended, active bowel sounds, non-tender   Genitourinary:  deferred   Extremities:   average muscle tone; no contractures, no joint effusions   Neurologic:  No gross focal motor or sensory abnormalities; CN 2-12 intact; Follows commands. Psychiatric:   appropriate and interactive. Labs: Results:   Chemistry Recent Labs      18   0520  18   0340  18   1407   GLU  89  86  97   NA  143  141  143   K  3.5  3.4*  3.4*   CL  106  106  107   CO2  31  28  28   BUN  8  13  13   CREA  0.84  0.77  0.98   CA  8.4*  7.9*  8.8   AGAP  6  7  8   BUCR  10*  17  13   AP   --    --   80   TP   --    --   7.8   ALB   --    --   3.1*   GLOB   --    --   4.7*   AGRAT   --    --   0.7*      CBC w/Diff Recent Labs      18   1407   WBC  10.3   RBC  5.20   HGB  14.0   HCT  41.8   PLT  158   GRANS  64   LYMPH  26   EOS  2        No results found for: SDES No results found for: CULT     Results for BONE, Khurram Arellano (MRN [id number]) as of 2018 14:45   Ref. Range 2018 05:20   INR Latest Ref Range: 0.8 - 1.2   1.1   Prothrombin time Latest Ref Range: 11.5 - 15.2 sec 13.7   aPTT Latest Ref Range: 23.0 - 36.4 .9 (H)     Imagin/22 TTE: Left ventricle: Systolic function was normal by visual assessment. Ejection   fraction was estimated in the range of 55 %  to 60 %. No obvious wall motion abnormalities identified in the views   obtained. Right ventricle:  The ventricle was moderately dilated. Systolic pressure was   mildly increased. Estimated peak pressure  was 37 mmHg. There was a structure in the RV apex, could not exclude   moderator band. Right atrium: The atrium was moderately dilated. 1/22 UE PVL: Right arm :  1. No evidence of deep venous thrombosis detected in the veins  visualized. 2. Deep vein(s) visualized include the internal jugular, subclavian,  axillary and brachial veins. 3. No evidence of superficial thrombosis detected. 4. Superficial vein(s) visualized include the basilic (upper arm) and  cephalic (upper arm) veins. Left arm :  1. No evidence of deep venous thrombosis detected in the veins  visualized. 2. Deep vein(s) visualized include the internal jugular, subclavian,  axillary and brachial veins. 3. No evidence of superficial thrombosis detected. 4. Superficial vein(s) visualized include the basilic (upper arm) and  cephalic (upper arm) veins. 1/22 LE PVL: Right leg :  1. Acute and occlusive deep venous thrombosis identified in the  posterior tibial and peroneal veins. 2. Deep veins visualized include the common femoral, femoral,  popliteal, posterior tibial and peroneal veins. 3. Superficial veins visualized include the great saphenous vein. 4. No evidence of superficial thrombosis detected. Left leg :  1. No evidence of deep venous thrombosis detected in the veins  visualized. 2. Deep veins visualized include the common femoral, femoral,  popliteal, posterior tibial and peroneal veins. 3. Superficial veins visualized include the great saphenous vein.   4. No evidence of superficial thrombosis detected.

## 2018-01-23 NOTE — PROGRESS NOTES
Met with patient at bedside  DX: Acute PE  Lives with spouse in a [place] home  Works full time  + car/+ drives  + ride home at [employer]  IADL's  DME: none  PCP: Dr. Christopher José ok: [facility]  No needs identified at this time  CM will continue to follow with IDT to assist with DC needs identified

## 2018-01-23 NOTE — PROGRESS NOTES
[facility]  Pulmonary, Critical Care, and Sleep Medicine    Name: Maryan Chacon MRN: [id number]   : 1965 Hospital: [address] [place] [address]   Date: 2018      [facility]   NEW PULMONARY CONSULT     Subjective:  IMPRESSION: ·   Bilateral pulmonary embolism with heavy burden of clot, s/p TPA and currently on heparin infusion - improved sob and chest pain, on RA    · Hx prior PE with tPA treatment acutely and on coumadin x1 year (about 5 years ago)       PLAN:   1. Hypercoaguable w/u for recurrent Thromboembolic disease : Antiphospholipid Abs, Fact V leiden, Lupus anticoagulant, PT gene mutation (all these are not affected in acute phase or with Tx). - While AT deficiency, Fact 8 levels, Protein C and S should be done when acute phase is over. - Check for PSA. I would not do hemoccult at this point as he got tpa/ heparin and may have a positive test.   - He should get Hem occult  a later date. - I will order CT abd/pelvis to look for any masses at this point.  - CT chest did not suggest abnormally large LNs. His Mediastinal LNs are within acceptable size range. Anyways, he would not a candidate for any invasive testing/biopsies at this time. I would recommend f/u PET scan as an outpatient and pursue any malignancy w/u as an outpatient. - Patient should f/u with Pulmonologist at [address] [place] [address] in 2-3 weeks of D/c  - Patient should f/u with Hematolgist as an outpatient. 2. Massive Saddle PE (UNPROVOKED) : Recommned life-long anticoagulation: Switch to Eliquis 10mg PO BID x 7 days, then 5 mg PO BID. I have discussed risks and benefits of medication. Patient understands and acknowledges.   - Patient has been advised to NOT get Humira shots atleast for few weeks as it can cause large hematomas.  He has been advised to discuss with his dermatologist for other options instead of shots and if he has to be on shots, he should be careful and call his doctor right away in case he notices large swelling or bleeding at the injection site while being on Eliquis. - Will get DOPPLER of LEs- has not been done yet. - Right Femoral Line can be removed after blood w/u is done with holding pressure after removing.  - Recommend to repeat Limited ECHO in 3 months to document heart function. After blood w/u, Doppler of LEs and CT Abd/Plevis is done patient can go home if no large DVTs are found.                 HPI:    Bebe Villa is a 46 y.o. male with past medical history of PE 5 years ago (previously received TPA and was on coumadin anticoagulation for a year), and HTN, found to have heavy burden of clot/ PE. Pt received TPA for hemodynamic instability and restarted on heparin infusion. At that time he had large Acute DVT in LLE, which was attributed to 10 hour non-stop car drive. He took coumadin for about a year. From that DVT he had LLE Varicose veins for which he required 2 surgeries. This time he came in with acute Chest pressure and SOB, CTA revealed Saddle b/l Massive PE, requiring systemic TPA and Echo/EKG/blood tests confirming Right heart strain. He recovered well. Currently on RA, denies any CP. Has dry cough. No sputum. On ROS: Denies noticing leg swellings this time. No leg pains. His LLE varicosity is not worse. He did have 1 episode of RLE cramping few days ago the PE which resolved sponateously in few mins. He did travel on and off, short trips 3-4 hrs with regular bathroom breaks over the holidays- Christmas/New years. He denies any family h/o clots. He denies being sick/ flu like symptoms but admits of having dry cough for 7-10 days before presenting to ED. He takes weekly shots of Humira by dermatologist for Hydra-adenitis. He denies any B-symptoms such as weight loss/night sweats/ appetite loss, dark stools, blood in stools, smoky/bloody urine. He denies noticing any swelling/ masses in Testes.  He has had USG of testes done recently by his PCP for swelling in Left groin. USG was negative for any testicular masses and Biopsy of left groin swelling revealed Hydradenitis    CT chest did not suggest abnormally large LNs. His Mediastinal LNs are within acceptable size range. Objective:   Vital Signs:    Visit Vitals    BP (!) 152/107 (BP 1 Location: Right arm, BP Patient Position: At rest)    Pulse 74    Temp 98.3 °F (36.8 °C)    Resp 20    Wt 108.2 kg (238 lb 9.6 oz)    SpO2 99%               Temp (24hrs), Av.7 °F (37.1 °C), Min:98.2 °F (36.8 °C), Max:99.2 °F (37.3 °C)       Intake/Output:   Last shift:         Last 3 shifts:  1901 -  0700  In: 240 [P.O.:240]  Out: 2300 [Urine:2300]    Intake/Output Summary (Last 24 hours) at 18 0952  Last data filed at 18 0441   Gross per 24 hour   Intake              240 ml   Output             1500 ml   Net            -1260 ml        Physical Exam:    General: NAD   HEENT: PERRL   Neck: no LNs   Chest: CTA b/l   Heart: S1S2, no MRGs   Abdomen:S/NT/ND BS+               Extremity: oozing at IV sites, no swelling or erythema in calves.  LLE mild varicosity             : No testicular masses/swelling   Neuro: AAOx 3      DATA:  Labs:  Recent Labs      18   1407   WBC  10.3   HGB  14.0   HCT  41.8   PLT  158     Recent Labs      18   0520  18   0340  18   1407   NA  143  141  143   K  3.5  3.4*  3.4*   CL  106  106  107   CO2  31  28  28   GLU  89  86  97   BUN  8  13  13   CREA  0.84  0.77  0.98   CA  8.4*  7.9*  8.8   MG   --   1.9   --    PHOS   --   3.1   --    ALB   --    --   3.1*   SGOT   --    --   39*   ALT   --    --   30   INR  1.1  1.3*  1.0                                                   Echo: RV Dilation/ RV function reduced    Imaging:  [x]I have personally reviewed the patients radiographs  []Radiographs reviewed with radiologist   []No change from prior, tubes and lines in adequate position  []Improved   []Worsening    High complexity decision making was performed during the evaluation of this patient at high risk for decompensation with multiple organ involvement     Above mentioned total time spent on reviewing the case/medical record/data/notes/EMR/patient examination/documentation/coordinating care with nurse/consultants, exclusive of procedures with complex decision making performed and > 50% time spent in face to face evaluation.     June Torres MD

## 2018-01-23 NOTE — PROCEDURES
[facility]  *** FINAL RE[facility] ***    Name: Heath Brunner  MRN: [id number]    Inpatient  : 14 Oct 1965  HIS Order #: [id number]  [id number] [facility] Visit #: [id number]  Date: 2018    TYPE OF TEST: Peripheral Venous Testing    REASON FOR TEST  Pulmonary embolism    Right Leg:-  Deep venous thrombosis:           Yes  Proximal extent of thrombus:      Posterior Tibial  Superficial venous thrombosis:    No  Deep venous insufficiency:        Not examined  Superficial venous insufficiency: Not examined    Left Leg:-  Deep venous thrombosis:           No  Superficial venous thrombosis:    No  Deep venous insufficiency:        Not examined  Superficial venous insufficiency: Not examined      INTERPRETATION/FINDINGS  Duplex images were obtained using 2-D gray scale, color flow, and  spectral Doppler analysis. Right leg :  1. Acute and occlusive deep venous thrombosis identified in the  posterior tibial and peroneal veins. 2. Deep veins visualized include the common femoral, femoral,  popliteal, posterior tibial and peroneal veins. 3. Superficial veins visualized include the great saphenous vein. 4. No evidence of superficial thrombosis detected. Left leg :  1. No evidence of deep venous thrombosis detected in the veins  visualized. 2. Deep veins visualized include the common femoral, femoral,  popliteal, posterior tibial and peroneal veins. 3. Superficial veins visualized include the great saphenous vein. 4. No evidence of superficial thrombosis detected. ADDITIONAL COMMENTS    I have personally reviewed the data relevant to the interpretation of  this  study. TECHNOLOGIST: NOREEN Colindres, JENNS  Signed: 2018 01:17 PM    PHYSICIAN: Dusty Bae MD  Signed: 2018 06:28 PM

## 2018-01-24 ENCOUNTER — APPOINTMENT (OUTPATIENT)
Dept: INTERVENTIONAL RADIOLOGY/VASCULAR | Age: 53
DRG: 252 | End: 2018-01-24
Attending: PHYSICIAN ASSISTANT
Payer: OTHER GOVERNMENT

## 2018-01-24 VITALS
OXYGEN SATURATION: 97 % | DIASTOLIC BLOOD PRESSURE: 87 MMHG | RESPIRATION RATE: 18 BRPM | SYSTOLIC BLOOD PRESSURE: 136 MMHG | WEIGHT: 238.6 LBS | HEIGHT: 73 IN | HEART RATE: 82 BPM | TEMPERATURE: 98.5 F

## 2018-01-24 LAB
ANION GAP SERPL CALC-SCNC: 6 MMOL/L (ref 3–18)
BUN SERPL-MCNC: 6 MG/DL (ref 7–18)
BUN/CREAT SERPL: 8 (ref 12–20)
CALCIUM SERPL-MCNC: 8.6 MG/DL (ref 8.5–10.1)
CHLORIDE SERPL-SCNC: 104 MMOL/L (ref 100–108)
CO2 SERPL-SCNC: 30 MMOL/L (ref 21–32)
CREAT SERPL-MCNC: 0.79 MG/DL (ref 0.6–1.3)
GLUCOSE SERPL-MCNC: 83 MG/DL (ref 74–99)
INR PPP: 1.2 (ref 0.8–1.2)
POTASSIUM SERPL-SCNC: 3.4 MMOL/L (ref 3.5–5.5)
PROTHROMBIN TIME: 15.1 SEC (ref 11.5–15.2)
SODIUM SERPL-SCNC: 140 MMOL/L (ref 136–145)

## 2018-01-24 PROCEDURE — 77030022017 HC DRSG HEMO QCLOT ZMED -A

## 2018-01-24 PROCEDURE — C1880 VENA CAVA FILTER: HCPCS

## 2018-01-24 PROCEDURE — C1769 GUIDE WIRE: HCPCS

## 2018-01-24 PROCEDURE — C1893 INTRO/SHEATH, FIXED,NON-PEEL: HCPCS

## 2018-01-24 PROCEDURE — 74011250637 HC RX REV CODE- 250/637: Performed by: INTERNAL MEDICINE

## 2018-01-24 PROCEDURE — 36011 PLACE CATHETER IN VEIN: CPT

## 2018-01-24 PROCEDURE — 85610 PROTHROMBIN TIME: CPT | Performed by: INTERNAL MEDICINE

## 2018-01-24 PROCEDURE — 36415 COLL VENOUS BLD VENIPUNCTURE: CPT | Performed by: INTERNAL MEDICINE

## 2018-01-24 PROCEDURE — 06H03DZ INSERTION OF INTRALUMINAL DEVICE INTO INFERIOR VENA CAVA, PERCUTANEOUS APPROACH: ICD-10-PCS | Performed by: RADIOLOGY

## 2018-01-24 PROCEDURE — 74011000250 HC RX REV CODE- 250: Performed by: RADIOLOGY

## 2018-01-24 PROCEDURE — 80048 BASIC METABOLIC PNL TOTAL CA: CPT | Performed by: INTERNAL MEDICINE

## 2018-01-24 PROCEDURE — 74011636320 HC RX REV CODE- 636/320: Performed by: RADIOLOGY

## 2018-01-24 PROCEDURE — 74011250636 HC RX REV CODE- 250/636: Performed by: RADIOLOGY

## 2018-01-24 RX ORDER — LIDOCAINE HYDROCHLORIDE 10 MG/ML
30 INJECTION, SOLUTION EPIDURAL; INFILTRATION; INTRACAUDAL; PERINEURAL ONCE
Status: COMPLETED | OUTPATIENT
Start: 2018-01-24 | End: 2018-01-24

## 2018-01-24 RX ORDER — FENTANYL CITRATE 50 UG/ML
50 INJECTION, SOLUTION INTRAMUSCULAR; INTRAVENOUS
Status: DISCONTINUED | OUTPATIENT
Start: 2018-01-24 | End: 2018-01-24

## 2018-01-24 RX ORDER — SODIUM CHLORIDE 0.9 % (FLUSH) 0.9 %
5-10 SYRINGE (ML) INJECTION AS NEEDED
Status: DISCONTINUED | OUTPATIENT
Start: 2018-01-24 | End: 2018-01-24 | Stop reason: HOSPADM

## 2018-01-24 RX ORDER — HYDROCHLOROTHIAZIDE 25 MG/1
25 TABLET ORAL DAILY
Qty: 30 TAB | Refills: 0 | Status: SHIPPED | OUTPATIENT
Start: 2018-01-25 | End: 2018-04-10

## 2018-01-24 RX ORDER — IODIXANOL 320 MG/ML
100 INJECTION, SOLUTION INTRAVASCULAR
Status: COMPLETED | OUTPATIENT
Start: 2018-01-24 | End: 2018-01-24

## 2018-01-24 RX ORDER — SODIUM CHLORIDE 0.9 % (FLUSH) 0.9 %
5-10 SYRINGE (ML) INJECTION EVERY 8 HOURS
Status: DISCONTINUED | OUTPATIENT
Start: 2018-01-24 | End: 2018-01-24 | Stop reason: HOSPADM

## 2018-01-24 RX ORDER — NALOXONE HYDROCHLORIDE 0.4 MG/ML
0.1 INJECTION, SOLUTION INTRAMUSCULAR; INTRAVENOUS; SUBCUTANEOUS
Status: DISCONTINUED | OUTPATIENT
Start: 2018-01-24 | End: 2018-01-24 | Stop reason: HOSPADM

## 2018-01-24 RX ADMIN — FAMOTIDINE 20 MG: 40 POWDER, FOR SUSPENSION ORAL at 09:53

## 2018-01-24 RX ADMIN — FENTANYL CITRATE 50 MCG: 50 INJECTION INTRAMUSCULAR; INTRAVENOUS at 14:11

## 2018-01-24 RX ADMIN — FENTANYL CITRATE 50 MCG: 50 INJECTION INTRAMUSCULAR; INTRAVENOUS at 14:29

## 2018-01-24 RX ADMIN — Medication 10 ML: at 06:00

## 2018-01-24 RX ADMIN — Medication 10 ML: at 14:00

## 2018-01-24 RX ADMIN — APIXABAN 10 MG: 5 TABLET, FILM COATED ORAL at 09:58

## 2018-01-24 RX ADMIN — FENTANYL CITRATE 50 MCG: 50 INJECTION INTRAMUSCULAR; INTRAVENOUS at 14:16

## 2018-01-24 RX ADMIN — LIDOCAINE HYDROCHLORIDE 30 ML: 10 INJECTION, SOLUTION EPIDURAL; INFILTRATION; INTRACAUDAL; PERINEURAL at 14:14

## 2018-01-24 RX ADMIN — IODIXANOL 100 ML: 320 INJECTION, SOLUTION INTRAVASCULAR at 14:22

## 2018-01-24 RX ADMIN — ACETAMINOPHEN 650 MG: 325 TABLET, FILM COATED ORAL at 12:29

## 2018-01-24 RX ADMIN — FENTANYL CITRATE 50 MCG: 50 INJECTION INTRAMUSCULAR; INTRAVENOUS at 14:24

## 2018-01-24 NOTE — DISCHARGE SUMMARY
Discharge Summary    Patient: Tucker Bright MRN: [id number]  CSN: [id number]    YOB: 1965  Age: 46 y.o. Sex: male    DOA: 1/21/2018 LOS:  LOS: 2 days   Discharge Date:      Admission Diagnoses: Pulmonary emboli [facility])    Discharge Diagnoses:    Problem List as of 1/24/2018  Never Reviewed          Codes Class Noted - Resolved    Pulmonary emboli [facility]) ICD-10-CM: I26.99  ICD-9-CM: 415.19  1/21/2018 - Present              Discharge Condition: Stable    PHYSICAL EXAM  Visit Vitals    BP (!) 157/114 (BP 1 Location: Right arm, BP Patient Position: At rest)    Pulse 83    Temp 98.3 °F (36.8 °C)    Resp 20    Ht 6' 1\" (1.854 m)    Wt 108.2 kg (238 lb 9.6 oz)    SpO2 95%       General: Alert, cooperative, no acute distress    HEENT: NC, Atraumatic. PERRLA, EOMI. Anicteric sclerae. Lungs:  CTA Bilaterally. No Wheezing/Rhonchi/Rales. Heart:  Regular  rhythm,  No murmur, No Rubs, No Gallops  Abdomen: Soft, protuberant, Non distended, Non tender.  +Bowel sounds, no HSM  Extremities: No c/c/e  Psych:   Good insight. Not anxious or agitated. Neurologic:  CN 2-12 grossly intact, oriented X 3. No acute neurological   deficits,     Hospital Course:   Mr. Elvia Lundborg is a 46 y.o. white male with history of saddle PE off anticoagulation who presented to the ER with SOB, dizziness, and an episode of syncope. Pt states he took a shower and got dressed when the SOB began and then had a syncopal episode.  Pt reports having a long road trip to  [place] 3 weeks ago. Burak Backer states pt was complaining of right leg pain last week but pt states pain was different from saddle PE he had previously.  Pt denies hx of bleeding.  Denies current use of blood thinners. In the ED, CTA performed which revealed bilateral acute pulmonary emboli. Since presentation, he has become hypotensive. Central line placed and TPA was performed. Massive Saddle PE (UNPROVOKED) : Recommned life-long anticoagulation: Completed heparin drip. Started Eliquis 10mg PO BID x 7 days on 1/23/17, then 5 mg PO BID.  - Patient has been advised to NOT get Humira shots atleast for few weeks as it can cause large hematomas. He has been advised to discuss with his dermatologist for other options instead of shots and if he has to be on shots, he should be careful and call his doctor right away in case he notices large swelling or bleeding at the injection site while being on Eliquis. - Recommend to repeat Limited ECHO in 3 months to document heart function. Acute DVT in RLE with large clot burden: Given he just had Massive PE and has significant Right heart strain from it, any new PE from Large burden of clots in RLE may prove to be fatal.   - IVC filter placed on day of discharge  - follow up closely by vascular/IR and Pulm/ Hematologist for taking the filter out in 8-10 weeks    Hypercoaguable w/u for recurrent Thromboembolic disease :   ---pending labs:  PSA, Antiphospholipid Abs, Fact V leiden, Lupus anticoagulant, PT gene mutation (all these are not affected in acute phase or with Tx). - Hemoccult, stool, AT deficiency, Fact 8 levels, Protein C and S should be done in 3 months-6 when acute phase is over. -CT abd/pelvis on 1/23/18 did not reveal any masses or abnormally enlarged LNs.  - CT chest did not suggest abnormally large LNs. His Mediastinal LNs are within acceptable size range. - Patient should f/u with Pulmonologist at [address] [place] [address] in 2-3 weeks of D/c  - Patient should f/u with Hematolgist as an outpatient. Consults:   Pulmonary/Critical Care: Dr. Wily Morel    Significant Diagnostic Studies:   1/23 CT abd/pelvis:   1. No primary malignancy identified abdomen/pelvis. 2.  Persistent right saddle embolus. Incompletely evaluated left hilum. 3.  New mild right subpleural groundglass may reflect infarct and/or  atelectasis.   4.  Age-indeterminate L3 compression fracture.           1/21 CT head :No CT evidence of acute intracranial pathology     1/21 CTA chest: 1. Acute pulmonary embolism, very large burden bilaterally extending to the  level of the central left and right pulmonary arteries. Evidence for right heart  strain as detailed above.      I have telephoned a wet reading regarding this critical result directly to Dr. Curly Kee prior to dictation of this report at 3:45 PM on 1/21/2018.      2.  Small subpleural regions of groundglass attenuation bilaterally, suspicious  for small areas of hemorrhage due to ischemia or infarct in the setting of acute  pulmonary embolism as discussed above.      3.  Borderline enlarged mediastinal and upper abdominal lymph nodes,  approximately 1 cm, nonspecific.      4.  Right thyroid lobe is mildly enlarged compared to the left as noted above    1/22 TTE: Left ventricle: Systolic function was normal by visual assessment. Ejection   fraction was estimated in the range of 55 %  to 60 %. No obvious wall motion abnormalities identified in the views   obtained. Right ventricle: The ventricle was moderately dilated. Systolic pressure was   mildly increased. Estimated peak pressure  was 37 mmHg. There was a structure in the RV apex, could not exclude   moderator band. Right atrium: The atrium was moderately dilated. 1/22 UE PVL: Right arm :  1. No evidence of deep venous thrombosis detected in the veins  visualized. 2. Deep vein(s) visualized include the internal jugular, subclavian,  axillary and brachial veins. 3. No evidence of superficial thrombosis detected. 4. Superficial vein(s) visualized include the basilic (upper arm) and  cephalic (upper arm) veins. Left arm :  1. No evidence of deep venous thrombosis detected in the veins  visualized. 2. Deep vein(s) visualized include the internal jugular, subclavian,  axillary and brachial veins. 3. No evidence of superficial thrombosis detected. 4. Superficial vein(s) visualized include the basilic (upper arm) and  cephalic (upper arm) veins.   1/22 LE PVL: Right leg :  1. Acute and occlusive deep venous thrombosis identified in the  posterior tibial and peroneal veins. 2. Deep veins visualized include the common femoral, femoral,  popliteal, posterior tibial and peroneal veins. 3. Superficial veins visualized include the great saphenous vein. 4. No evidence of superficial thrombosis detected. Left leg :  1. No evidence of deep venous thrombosis detected in the veins  visualized. 2. Deep veins visualized include the common femoral, femoral,  popliteal, posterior tibial and peroneal veins. 3. Superficial veins visualized include the great saphenous vein. 4. No evidence of superficial thrombosis detected. Discharge Medications:     1. Elequis 10 mg po bid through 1/29/18, then initiate Elequis 5 mg po bid (lifelong)  2. Hydrochlorothiazide 25 mg po daily    Activity: no aggressive aerobic activity or weight lifting, may walk short distances and slowly increase as tolerated. Off work x 2 weeks from discharge.      Diet: Regular Diet    Wound Care: None needed    Follow-up: with PCP, PROVIDER UNKNOWN in 7-10days    Minutes spent on discharge: >30 minutes spent coordinating this discharge (review instructions/follow-up, prescriptions, preparing report for sign off)

## 2018-01-24 NOTE — ROUTINE PROCESS
Bedside shift change report given to ANGELA Herrera (oncoming nurse) by Shahzad Small (offgoing nurse). Report included the following information SBAR, Kardex, MAR and Recent Results.

## 2018-01-24 NOTE — PROGRESS NOTES
TRANSFER - OUT REPORT:    Verbal report given to Pineda Kendrick RN(name) on Marylee Chamber  being transferred to CVT SD(unit) for routine post - op       Report consisted of patients Situation, Background, Assessment and   Recommendations(SBAR). Information from the following report(s) SBAR, Kardex and MAR was reviewed with the receiving nurse. Lines:   Triple Lumen 01/21/18 Right Femoral (Active)   Central Line Being Utilized Yes 1/23/2018  4:00 PM   Criteria for Appropriate Use Hemodynamically unstable, requiring monitoring lines, vasopressors, or volume resuscitation 1/23/2018  8:00 AM   Site Assessment Clean, dry, & intact 1/23/2018  4:00 PM   Infiltration Assessment 0 1/23/2018  4:00 PM   Dressing Status Clean, dry, & intact 1/21/2018  5:24 PM   Dressing Type Disk with Chlorhexadine gluconate (CHG); Tape;Transparent 1/23/2018 12:00 PM   Alcohol Cap Used Yes 1/23/2018  4:00 PM       Peripheral IV 01/21/18 Left Antecubital (Active)   Site Assessment Clean, dry, & intact 1/24/2018  8:00 AM   Phlebitis Assessment 0 1/24/2018  8:00 AM   Infiltration Assessment 0 1/24/2018  8:00 AM   Dressing Status Clean, dry, & intact 1/24/2018  8:00 AM   Dressing Type Tape;Transparent 1/24/2018  8:00 AM   Hub Color/Line Status Pink 1/24/2018  8:00 AM   Alcohol Cap Used Yes 1/23/2018  4:00 PM       Peripheral IV 01/21/18 Right Antecubital (Active)   Site Assessment Clean, dry, & intact 1/24/2018  8:00 AM   Phlebitis Assessment 0 1/24/2018  8:00 AM   Infiltration Assessment 0 1/24/2018  8:00 AM   Dressing Status Clean, dry, & intact 1/24/2018  8:00 AM   Dressing Type Tape;Transparent 1/24/2018  8:00 AM   Hub Color/Line Status Pink 1/24/2018  8:00 AM   Alcohol Cap Used Yes 1/23/2018  4:00 PM        Opportunity for questions and clarification was provided.       Patient transported with:   [employer]

## 2018-01-24 NOTE — PROGRESS NOTES
[facility]  Pulmonary, Critical Care, and Sleep Medicine    Name: Evangelina Gama MRN: [id number]   : 1965 Hospital: [address] [place] [address]   Date: 2018      Subjective:      IMPRESSION: ·   1. New Acute DVT in RLE with Large clot burden  · 2. Bilateral pulmonary embolism with heavy burden of clot, s/p TPA and currently on heparin infusion - improved sob and chest pain, on RA    · 3. Hx prior PE with tPA treatment acutely and on coumadin x1 year (about 5 years ago)        PLAN: ·   1. Acute DVT in RLE with large clot burden: Given he just had Massive PE and has significant Right heart strain from it, any new PE from Large burden of clots in RLE may prove to be fatal. Will pursue placement of IVC filter. Risks and Benefits are discussed with the patient. Discussed with hospitalist who has communicated promptly with IR. Complete bed-rest advised for now, till IVC filter can be placed by VIR tomorrow morning.   - After IVC filter placement patient should be followed up closely by vascular/IR and Pulm/ Hematologist for taking the filter out in 8-10 weeks. · 2. Hypercoaguable w/u for recurrent Thromboembolic disease : Sent for Antiphospholipid Abs, Fact V leiden, Lupus anticoagulant, PT gene mutation (all these are not affected in acute phase or with Tx). - While AT deficiency, Fact 8 levels, Protein C and S should be done when acute phase is over. - Sent for PSA. I would not do hemoccult at this point as he got tpa/ heparin and may have a positive test.   - He should get Hem occult  a later date. -CT abd/pelvis on 18 did not reveal any masses or abnormally enlarged LNs.  - CT chest did not suggest abnormally large LNs. His Mediastinal LNs are within acceptable size range. - Patient should f/u with Pulmonologist at [facility] in 2-3 weeks of D/c  - Patient should f/u with Hematolgist as an outpatient.     · 3.  Massive Saddle PE (UNPROVOKED) : Recommned life-long anticoagulation: Switch to Eliquis 10mg PO BID x 7 days, then 5 mg PO BID. I have discussed risks and benefits of medication. Patient understands and acknowledges.   - Patient has been advised to NOT get Humira shots atleast for few weeks as it can cause large hematomas. He has been advised to discuss with his dermatologist for other options instead of shots and if he has to be on shots, he should be careful and call his doctor right away in case he notices large swelling or bleeding at the injection site while being on Eliquis. - Will get DOPPLER of LEs- has not been done yet. - Right Femoral Line can be removed after blood w/u is done with holding pressure after removing.  - Recommend to repeat Limited ECHO in 3 months to document heart function.                INTERVAL:  No new events O/n. Doing well on RA     HPI:     Pricila Osborne a 46 y. o. male with past medical history of PE 5 years ago (previously received TPA and was on coumadin anticoagulation for a year), and HTN, found to have heavy burden of clot/ PE. Pt received TPA for hemodynamic instability and restarted on heparin infusion. At that time he had large Acute DVT in LLE, which was attributed to 10 hour non-stop car drive. He took coumadin for about a year. From that DVT he had LLE Varicose veins for which he required 2 surgeries.     This time he came in with acute Chest pressure and SOB, CTA revealed Saddle b/l Massive PE, requiring systemic TPA and Echo/EKG/blood tests confirming Right heart strain. He recovered well. Currently on RA, denies any CP. Has dry cough. No sputum.     On ROS: Denies noticing leg swellings this time. No leg pains. His LLE varicosity is not worse. He did have 1 episode of RLE cramping few days ago the PE which resolved sponateously in few mins. He did travel on and off, short trips 3-4 hrs with regular bathroom breaks over the holidays- Tomas/New years. He denies any family h/o clots.   He denies being sick/ flu like symptoms but admits of having dry cough for 7-10 days before presenting to ED.     He takes weekly shots of Humira by dermatologist for Hydra-adenitis.     He denies any B-symptoms such as weight loss/night sweats/ appetite loss, dark stools, blood in stools, smoky/bloody urine.      He denies noticing any swelling/ masses in Testes. He has had USG of testes done recently by his PCP for swelling in Left groin. USG was negative for any testicular masses and Biopsy of left groin swelling revealed Hydradenitis     CT chest did not suggest abnormally large LNs. His Mediastinal LNs are within acceptable size range.            Objective:   Vital Signs:    Visit Vitals    BP (!) 157/114 (BP 1 Location: Right arm, BP Patient Position: At rest)    Pulse 83    Temp 98.3 °F (36.8 °C)    Resp 20    Ht 6' 1\" (1.854 m)    Wt 108.2 kg (238 lb 9.6 oz)    SpO2 95%       O2 Device: Room air   O2 Flow Rate (L/min): 2 l/min   Temp (24hrs), Av.3 °F (36.8 °C), Min:97.5 °F (36.4 °C), Max:99.3 °F (37.4 °C)       Intake/Output:   Last shift:       0701 -  1900  In: 360 [P.O.:360]  Out: 1075 [Urine:1075]  Last 3 shifts:  1901 -  0700  In: 720 [P.O.:720]  Out: 7565 [Urine:2375]    Intake/Output Summary (Last 24 hours) at 18 1025  Last data filed at 18 0939   Gross per 24 hour   Intake              600 ml   Output             1950 ml   Net            -1350 ml      Physical Exam:                         General: NAD                        HEENT: PERRL                        Neck: no LNs                        Chest: CTA b/l                        Heart: S1S2, no MRGs                        Abdomen:S/NT/ND BS+                                          Extremity: oozing at IV sites, no swelling or erythema in calves.  LLE mild varicosity                        : No testicular masses/swelling                        Neuro: AAOx 3       DATA:  Labs:  Recent Labs      18   1407   WBC 10. 3   HGB  14.0   HCT  41.8   PLT  158     Recent Labs      01/24/18   0223  01/23/18   0520  01/22/18   0340  01/21/18   1407   NA  140  143  141  143   K  3.4*  3.5  3.4*  3.4*   CL  104  106  106  107   CO2  30  31  28  28   GLU  83  89  86  97   BUN  6*  8  13  13   CREA  0.79  0.84  0.77  0.98   CA  8.6  8.4*  7.9*  8.8   MG   --    --   1.9   --    PHOS   --    --   3.1   --    ALB   --    --    --   3.1*   SGOT   --    --    --   39*   ALT   --    --    --   30   INR  1.2  1.1  1.3*  1.0     Imaging:  [x]I have personally reviewed the patients radiographs  []Radiographs reviewed with radiologist   []No change from prior, tubes and lines in adequate position  []Improved   []Worsening    High complexity decision making was performed during the evaluation of this patient at high risk for decompensation with multiple organ involvement     Above mentioned total time spent on reviewing the case/medical record/data/notes/EMR/patient examination/documentation/coordinating care with nurse/consultants, exclusive of procedures with complex decision making performed and > 50% time spent in face to face evaluation.     Alexis Carmichael MD

## 2018-01-24 NOTE — PROCEDURES
RADIOLOGY POST PROCEDURE NOTE     January 24, 2018       2:35 PM     Preoperative Diagnosis:   PE and DVT. Postoperative Diagnosis:  Same. :  Dr. Radha Becker    Assistant:  None. Type of Anesthesia: 1% plain lidocaine and IV moderate sedation with Versed and Fentanyl    Procedure/Description:  Image guided IVC filter placement. Findings:   No bleeding. Estimated blood Loss:  Minimal    Specimen Removed:   no    Blood transfusions:  None. Implants:  Argon Option Elite retrievable, MRI and CT compatible IVC filter. Complications: None    Condition: Stable    Discharge Plan:  continue present therapy     Please contact IR when filter needs to be removed.     Minesh Caro MD

## 2018-01-24 NOTE — CONSULTS
INTERVENTIONAL RADIOLOGY CONSULT                  Deann Akers is a 46 y.o. male who is being seen at the request of Dr Arnol Andrew for recurrent DVT/PE. Indication/CC:  H/o PE    Impression:     Patient Active Problem List    Diagnosis Date Noted    Pulmonary emboli ([address] 01/21/2018         Plan:   1. Image guided IVC filter placement  2. Consents for procedure obtained    NPO:  Not NPO    Blood Thinners:  Eliquis 10 and 5 mg daily. OK to proceed per Dr Radha Becker. Coag/Plt count: INR 1.2 .9      Cr 0.79  K+ 3.4    History of present illness:  45 yo male with h/o saddle PE off anticoagulation was admitted with c/o SOB, dizziness, and syncope. Pt presents with a new acute DVT in the RLE with large clot burden and bilateral PE with heavy burden of clot. Pt requires an IVC filter. Case discussed between Dr Sandy Bonilla and Dr Radha Becker. Currently, pt is resting comfortably laying in bed with wife at bedside. Reports having a headache, high blood pressure, and a cough. Denies f/c, n/v, CP, SOB, abdominal pain. Imaging:  CT 1/23/18    Past Medical History:  No past medical history on file. Medications:   Prior to Admission medications    Medication Sig Start Date End Date Taking? Authorizing Provider   apixaban (ELIQUIS) 5 mg tablet Take 2 Tabs by mouth two (2) times a day for 5 days. Indications: pulmonary thromboembolism 1/24/18 1/29/18 Yes Arnol Andrew MD   apixaban (ELIQUIS) 5 mg tablet Start this medication on 1/30/AFTER Elequis 10 mg dose has been completed  Indications: pulmonary thromboembolism 1/24/18  Yes Arnol Andrew MD   hydroCHLOROthiazide (HYDRODIURIL) 25 mg tablet Take 1 Tab by mouth daily. 1/25/18  Yes Arnol Andrew MD       Allergies:  No Known Allergies    Social History:  Social History     Social History    Marital status:      Spouse name: N/A    Number of children: N/A    Years of education: N/A     Occupational History    Not on file. Social History Main Topics    Smoking status: Not on file    Smokeless tobacco: Not on file    Alcohol use Not on file    Drug use: Not on file    Sexual activity: Not on file     Other Topics Concern    Not on file     Social History Narrative       Family History:  No family history on file.     Review of Systems:    General:  No f/c, n/v, +headache, +cough  Cardio:  No CP  Pulm:  No SOB  Abd:  No abdominal pain    Data:  Basic Metabolic Profile   Lab Results   Component Value Date     01/24/2018    CO2 30 01/24/2018    BUN 6 (L) 01/24/2018        CBC w/Diff   Lab Results   Component Value Date/Time    WBC 10.3 01/21/2018 02:07 PM    HCT 41.8 01/21/2018 02:07 PM        Coagulation   Lab Results   Component Value Date    INR 1.2 01/24/2018    APTT 28.4 01/23/2018          Physical Assessment:  Visit Vitals    BP (!) 157/114 (BP 1 Location: Right arm, BP Patient Position: At rest)    Pulse 83    Temp 98.3 °F (36.8 °C)    Resp 20    Ht 6' 1\" (1.854 m)    Wt 108.2 kg (238 lb 9.6 oz)    SpO2 96%       Const:  NAD, alert and oriented x3   Cardio:  RRR, no murmurs, rubs, or gallops  Pulm:  CTAB, no wheezes, rales, or rhonchi  Abd:  Soft, NT, ND, +BS  Extr:  No LE edema, bilat  Skin:  Warm and dry        Arlette Christine PA-C  For Zoraida Wheatley MD

## 2018-01-24 NOTE — ROUTINE PROCESS
Spoke with on call MD Dr. Debbie Mccloud in reference to patient elevated b/p and not being on home meds at present. Chart reviewed with MD orders received.

## 2018-01-24 NOTE — DISCHARGE INSTRUCTIONS
DISCHARGE SUMMARY from Nurse    PATIENT INSTRUCTIONS:    After general anesthesia or intravenous sedation, for 24 hours or while taking prescription Narcotics:  · Limit your activities  · Do not drive and operate hazardous machinery  · Do not make important personal or business decisions  · Do  not drink alcoholic beverages  · If you have not urinated within 8 hours after discharge, please contact your surgeon on call. Report the following to your surgeon:  · Excessive pain, swelling, redness or odor of or around the surgical area  · Temperature over 100.5  · Nausea and vomiting lasting longer than 4 hours or if unable to take medications  · Any signs of decreased circulation or nerve impairment to extremity: change in color, persistent  numbness, tingling, coldness or increase pain  · Any questions    What to do at Home:  Recommended activity: Activity as tolerated as directed by the doctor. If you experience any of the following symptoms chest pain/shortness of breath, please follow up with the Emergency Room. *  Please give a list of your current medications to your Primary Care Provider. *  Please update this list whenever your medications are discontinued, doses are      changed, or new medications (including over-the-counter products) are added. *  Please carry medication information at all times in case of emergency situations. These are general instructions for a healthy lifestyle:    No smoking/ No tobacco products/ Avoid exposure to second hand smoke  Surgeon General's Warning:  Quitting smoking now greatly reduces serious risk to your health.     Obesity, smoking, and sedentary lifestyle greatly increases your risk for illness    A healthy diet, regular physical exercise & weight monitoring are important for maintaining a healthy lifestyle    You may be retaining fluid if you have a history of heart failure or if you experience any of the following symptoms:  Weight gain of 3 pounds or more overnight or 5 pounds in a week, increased swelling in our hands or feet or shortness of breath while lying flat in bed. Please call your doctor as soon as you notice any of these symptoms; do not wait until your next office visit. Recognize signs and symptoms of STROKE:    F-face looks uneven    A-arms unable to move or move unevenly    S-speech slurred or non-existent    T-time-call 911 as soon as signs and symptoms begin-DO NOT go       Back to bed or wait to see if you get better-TIME IS BRAIN. Warning Signs of HEART ATTACK     Call 911 if you have these symptoms:   Chest discomfort. Most heart attacks involve discomfort in the center of the chest that lasts more than a few minutes, or that goes away and comes back. It can feel like uncomfortable pressure, squeezing, fullness, or pain.  Discomfort in other areas of the upper body. Symptoms can include pain or discomfort in one or both arms, the back, neck, jaw, or stomach.  Shortness of breath with or without chest discomfort.  Other signs may include breaking out in a cold sweat, nausea, or lightheadedness. Don't wait more than five minutes to call 911 - MINUTES MATTER! Fast action can save your life. Calling 911 is almost always the fastest way to get lifesaving treatment. Emergency Medical Services staff can begin treatment when they arrive -- up to an hour sooner than if someone gets to the hospital by car. The discharge information has been reviewed with the patient. The patient verbalized understanding. Discharge medications reviewed with the patient and appropriate educational materials and side effects teaching were provided. ___________________________________________________________________________________________________________________________________           Learning About How to Prevent Blood Clots  What is a blood clot? A blood clot is a clump of blood that forms in a blood vessel, such as a vein or an artery.  If a clot gets stuck in a blood vessel, it can cause serious problems like a deep vein thrombosis (DVT) or a pulmonary embolism. A DVT is a blood clot in certain veins of the legs, pelvis, or arms. It most often occurs in the legs. Blood clots in these veins need to be treated, because they can get bigger, break loose, and travel through the bloodstream to the heart and then to the lungs. This causes a pulmonary embolism. A pulmonary embolism is a sudden blockage of an artery in the lung. Blood clots in the deep veins of the leg are the most common cause of a pulmonary embolism. In many cases, the clots are small. They may damage the lung. But if the clot is large and stops blood flow to the lung, it can be deadly. What increases your risk for blood clots? Some of the things that can increase your risk for a blood clot include:  Slowed blood flow  When blood doesn't flow normally, clots are more likely to develop. Reduced blood flow may result from long-term bed rest, such as after a surgery, injury, or serious illness. Or it may result from sitting for a long time, especially when traveling long distances. Abnormal clotting  Some people have blood that clots too easily or too quickly. Problems that may cause increased clotting include:  · Having certain blood problems that make blood clot too easily. This is a problem that may run in families. · Having certain health problems, such as cancer, heart failure, stroke, or severe infection. · Being pregnant. A woman's risk of getting blood clots increases both during pregnancy and shortly after delivery or after a  section. · Using hormonal forms of birth control or hormone therapy. · Smoking. Injury to the blood vessel wall  Blood is more likely to clot in veins and arteries shortly after they are injured. Injury can be caused by a recent medical procedure or surgery that involved your legs, hips, belly, or brain.  Or it can be caused by an injury, such as a broken hip. What can you do to prevent blood clots? After any procedure or event that increases your risk  · Take a blood-thinning medicine (called an anticoagulant) as directed if your doctor prescribes one. · Exercise your lower leg muscles to help keep the blood moving through your legs. Point your toes up toward your head so the calves of your legs are stretched, then relax. Repeat. This is a good exercise to do when you are sitting for long periods of time. · Get up out of bed as soon as you safely can or as soon as your doctor says it's okay after an illness or surgery. If you can't get out of bed, you can do the leg exercise described above. Try to do this leg exercise every hour when you are awake. This will help keep the blood moving through your legs. If you are in the hospital and need to stay in bed, your doctor may have you use a special device that inflates and deflates knee-high boots to help keep blood from pooling in your legs. · Use compression stockings if your doctor prescribes them. These are specially fitted stockings that may prevent blood clots by keeping blood from pooling in your legs. When you travel  · Take breaks when you travel. On long car trips, stop the car and walk around every hour or so. On long bus or train rides or plane flights, get out of your seat and walk up and down the aisle every hour or so. · Do leg exercises while you are seated. For example, pump your feet up and down by pulling your toes up toward your knees and then pointing them down. If you already have a risk of blood clots, talk to your doctor before taking a long trip. Your doctor may want you to wear compression stockings or take blood-thinning medicine. Take care of your body  · Be active. Try to get 30 minutes or more of activity on most days of the week. · Don't smoke. Smoking can increase your risk of blood clots.  If you need help quitting, talk to your doctor about stop-smoking programs and medicines. · Check with your doctor about whether you should use hormonal forms of birth control or hormone therapy. These may increase your risk of blood clots. When should you call for help? Call 911 anytime you think you may need emergency care. For example, call if:  · You have symptoms of a blood clot in your lung (called a pulmonary embolism). These may include:  ¨ Sudden chest pain. ¨ Trouble breathing. ¨ Coughing up blood. Call your doctor now or seek immediate medical care if:  · You have symptoms of a blood clot in your arm or leg (called a deep vein thrombosis). These may include:  ¨ Pain in the arm, calf, back of the knee, thigh, or groin. ¨ Redness and swelling in the arm, leg, or groin. Where can you learn more? Go to http://amna-dwayne.info/. Enter F229 in the search box to learn more about \"Learning About How to Prevent Blood Clots. \"  Current as of: March 20, 2017  Content Version: 11.4  © 8667-8280 [employer]. Care instructions adapted under license by [employer] (which disclaims liability or warranty for this information). If you have questions about a medical condition or this instruction, always ask your healthcare professional. [place] [address] any warranty or liability for your use of this information. Vena Cava Filter Placement: What to Expect at [facility]    A vena cava filter was put into the vena cava using a thin, flexible tube (catheter) that was inserted through a vein in your neck or groin. A vena cava filter helps prevent blood clots from traveling to the lungs and heart, where they may block blood flow. The filter may be permanent or it may be removed later. Vena cava filters may be used if you have problems taking a medicine (called a blood thinner) that prevents blood clots. After having a vena cava filter placed, you may feel tired and have some pain for several days.  You may have a small bandage where the catheter was placed. This care sheet gives you a general idea about how long it will take for you to recover. But each person recovers at a different pace. Follow the steps below to feel better as quickly as possible. How can you care for yourself at home? Activity  ? · Take it easy for a day or two. Avoid strenuous activities, such as bicycle riding, jogging, weight lifting, or aerobic exercise, until your doctor says it is okay. Diet  ? · You can eat your normal diet. If your stomach is upset, try bland, low-fat foods like plain rice, broiled chicken, toast, and yogurt. ? · Drink plenty of fluids to avoid becoming dehydrated. Medicines  ? · Your doctor will tell you if and when you can restart your medicines. He or she will also give you instructions about taking any new medicines. ? · If you take blood thinners, such as warfarin (Coumadin), clopidogrel (Plavix), or aspirin, be sure to talk to your doctor. He or she will tell you if and when to start taking those medicines again. Make sure that you understand exactly what your doctor wants you to do. ? · Be safe with medicines. Take pain medicines exactly as directed. ¨ If the doctor gave you a prescription medicine for pain, take it as prescribed. ¨ If you are not taking a prescription pain medicine, ask your doctor if you can take an over-the-counter medicine. ? · If you think your pain medicine is making you sick to your stomach:  ¨ Take your medicine after meals (unless your doctor has told you not to). ¨ Ask your doctor for a different pain medicine. ?Care of the catheter site  ? · Keep a bandage over the spot where the catheter was inserted for the first day, or for as long as your doctor recommends. ? · Put ice or a cold pack on the area for 10 to 20 minutes at a time to help with soreness or swelling. Do this every few hours. Put a thin cloth between the ice and your skin.    Follow-up care is a key part of your treatment and safety. Be sure to make and go to all appointments, and call your doctor if you are having problems. It's also a good idea to know your test results and keep a list of the medicines you take. When should you call for help? Call 911 anytime you think you may need emergency care. For example, call if:  ? · You passed out (lost consciousness). ? · You have severe trouble breathing. ? · You have sudden chest pain and shortness of breath, or you cough up blood. ?Call your doctor now or seek immediate medical care if:  ? · You have pain that does not get better after you take pain medicine. ? · You are bleeding through your dressing. A small amount of bleeding is normal.   ? · You have a fast-growing, painful lump at the catheter site. ? · You have signs of infection, such as:  ¨ Increased pain, swelling, warmth, or redness. ¨ Red streaks leading from the incision. ¨ Pus draining from the incision. ¨ A fever. ? · You have symptoms of a blood clot in your leg, such as:  ¨ Pain in the calf, back of the knee, thigh, or groin. ¨ Redness and swelling in your leg or groin. ? Watch closely for any changes in your health, and be sure to contact your doctor if you have any problems. Where can you learn more? Go to http://amna-dwayne.info/. Enter I189 in the search box to learn more about \"Vena Cava Filter Placement: What to Expect at Home. \"  Current as of: March 20, 2017  Content Version: 11.4  © 6137-7955 [employer]. Care instructions adapted under license by [email or web address] (which disclaims liability or warranty for this information). If you have questions about a medical condition or this instruction, always ask your healthcare professional. [address] any warranty or liability for your use of this information.

## 2018-01-25 ENCOUNTER — TELEPHONE (OUTPATIENT)
Dept: CASE MANAGEMENT | Age: 53
End: 2018-01-25

## 2018-01-25 LAB
INTERPRETATION, 117893: NORMAL
PS IGA SER-ACNC: 2 APS IGA (ref 0–20)
PS IGG SER-ACNC: 2 GPS IGG (ref 0–11)
PS IGM SER-ACNC: 5 MPS IGM (ref 0–25)
SCREEN APTT: 33.9 SEC (ref 0–51.9)
SCREEN DRVVT: 41.4 SEC (ref 0–47)

## 2018-01-25 NOTE — TELEPHONE ENCOUNTER
Spoke with  Kwasi Umanzor, states he is feeling pretty good, got a good nights sleep. He was able to get his prescriptions filled here at the hospital before discharge, and he was called this morning with his follow up scheduled appointments. He has no further questions or concerns for at this time.

## 2018-01-29 LAB
ADDITIONAL INFORMATION 480297: NORMAL
COMMENT, 511217: ABNORMAL
F2 GENE MUT ANL BLD/T: NORMAL
F5 GENE MUT ANL BLD/T: ABNORMAL

## 2018-02-08 ENCOUNTER — OFFICE VISIT (OUTPATIENT)
Dept: PULMONOLOGY | Age: 53
End: 2018-02-08

## 2018-02-08 VITALS
HEART RATE: 87 BPM | DIASTOLIC BLOOD PRESSURE: 80 MMHG | RESPIRATION RATE: 18 BRPM | SYSTOLIC BLOOD PRESSURE: 130 MMHG | OXYGEN SATURATION: 98 % | BODY MASS INDEX: 32.19 KG/M2 | WEIGHT: 244 LBS | TEMPERATURE: 97.7 F

## 2018-02-08 DIAGNOSIS — D68.59 HYPERCOAGULABLE STATE (HCC): ICD-10-CM

## 2018-02-08 DIAGNOSIS — I26.92 SADDLE EMBOLUS OF PULMONARY ARTERY WITHOUT ACUTE COR PULMONALE, UNSPECIFIED CHRONICITY (HCC): Primary | ICD-10-CM

## 2018-02-08 DIAGNOSIS — I82.401 DEEP VEIN THROMBOSIS (DVT) OF RIGHT LOWER EXTREMITY, UNSPECIFIED CHRONICITY, UNSPECIFIED VEIN (HCC): ICD-10-CM

## 2018-02-08 DIAGNOSIS — R06.2 WHEEZING: ICD-10-CM

## 2018-02-08 DIAGNOSIS — R93.89 ABNORMAL FINDING ON IMAGING: ICD-10-CM

## 2018-02-08 DIAGNOSIS — Z87.891 FORMER SMOKER: ICD-10-CM

## 2018-02-08 RX ORDER — AMLODIPINE AND VALSARTAN 10; 160 MG/1; MG/1
1 TABLET ORAL DAILY
COMMUNITY
End: 2018-04-10

## 2018-02-08 NOTE — PROGRESS NOTES
[facility]  Pulmonary, Critical Care, and Sleep Medicine      Pulmonary Office Progress Notes    Name: Nohemy Keen     : 1965     Date: 2018        Subjective:     Patient is a 46 y.o. male is here for follow up for [facility] for pulmonary embolism and right LE DVT. -2018 hospitalization:  HPI (from hospital record copied)  Shaw Frias a 46 y. o. male with past medical history of PE 5 years ago (previously received TPA and was on coumadin anticoagulation for a year), and HTN, found to have heavy burden of clot/ PE. Pt received TPA for hemodynamic instability and restarted on heparin infusion. At that time he had large Acute DVT in LLE, which was attributed to 10 hour non-stop car drive. He took coumadin for about a year. From that DVT he had LLE Varicose veins for which he required 2 surgeries.      This time he came in with acute Chest pressure and SOB, CTA revealed Saddle b/l Massive PE, requiring systemic TPA and Echo/EKG/blood tests confirming Right heart strain. He recovered well. Currently on RA, denies any CP. Has dry cough. No sputum. No aggressive aerobic activity or weight lifting, may walk short distances and slowly increase as tolerated. Off work x 2 weeks from discharge. Interval history:  Presented to the office today post hospitalization on room air accompanied by wife. He report no SOB walking in the house, currently his activity is limited to walking slowly  as tolerated short distance and increase as tolerated , no heavy lifting or overexertion. He report intermittent wheezing prior to hospitalization-he is not on any inhaler or nebulizer, no PFt done as out pt,  he is a former smoker. He denies cough or mucus, no hemoptysis or any bleeding on all body orifices. He denies chest pain, PND but report chronic LE mild edema. He report appetite is fair, no choking or dysphagia.     He report currently on Elliquis 5 mg BID and Amlodipine/Valsatan 10/160 mg every day. Travel history: no  Sick contact: no  Lives with: Wife     Social History     Social History    Marital status:      Spouse name: N/A    Number of children: N/A    Years of education: N/A     Occupational History    Not on file. Social History Main Topics    Smoking status: Former Smoker     Types: Cigarettes     Quit date: 2/1/2004    Smokeless tobacco: Not on file    Alcohol use Not on file    Drug use: Not on file    Sexual activity: Not on file     Other Topics Concern    Not on file     Social History Narrative    No narrative on file      No past surgical history on file. No Known Allergies    Current Outpatient Prescriptions   Medication Sig Dispense Refill    apixaban (ELIQUIS) 5 mg tablet Start this medication on 1/30/AFTER Elequis 10 mg dose has been completed  Indications: pulmonary thromboembolism 60 Tab 0    hydroCHLOROthiazide (HYDRODIURIL) 25 mg tablet Take 1 Tab by mouth daily. 30 Tab 0     Patient Active Problem List   Diagnosis Code    Pulmonary emboli (HCC) I26.99        Review of Systems:  Constitutional: No fever, no chills, no weight loss, no night sweats   HEENT: No epistaxis, no nasal drainage, no difficulty in swallowing, no redness in eyes  Respiratory: as above  Cardiovascular: no chest pain, no palpitations, no chronic leg edema, no syncope  Gastrointestinal: no abd pain, no vomiting, no diarrhea, no bleeding symptoms  Genitourinary: No urinary symptoms or hematuria  Integument/breast: No ulcers or rashes  Musculoskeletal:Neg  Neurological: No focal weakness, no seizures, no headaches  Behvioral/Psych: No anxiety, no depression       Objective:     Visit Vitals    /80 (BP 1 Location: Right arm, BP Patient Position: Sitting)    Pulse 87    Temp 97.7 °F (36.5 °C) (Oral)    Resp 18    Wt 110.7 kg (244 lb)    SpO2 98%    BMI 32.19 kg/m2        Physical Exam:  General/Neurology:  Alert to name, place and time. NAD.    Head: Normocephalic, without obvious abnormality, atraumatic. Eye:  No scleral icterus, no pallor, no cyanosis  Nose:  No sinus tenderness, no erythema, no induration, no discharge  Throat:  Lips, mucosa, and tongue normal. Teeth/Gums normal. No tonsillar enlargement, no erythema, no exudates. No oral thrush. Neck:  Supple, symmetric. No JVD. Thyroid: no enlargement/tenderness/nodule. No lymphadenopathy. Trachea midline  Back & spine:  Symmetric, no curvature. Chest wall:  No tenderness or deformity. No rash. Lung: Moderate air entry bilateral equal. No stridors. No rales. No rhonchi. No wheezing. No prolonged expiration. No accessory muscle use. Heart:   Regular rate & rhythm. S1 S2 present. No murmur. Abdomen/: Soft, NT, ND, +BS, no masses, no organomegaly  Extremities: Trace pedal edema. No cyanosis. No clubbing  Pulses:  2+ and symmetric in DP  Lymphatic:  No cervical, supraclavicular palpable lymphadenopathy. Musculoskeletal: No joint swelling or tenderness.    Skin:  Color, texture, turgor normal. No rashes or lesions    Data review:       Admission on 01/21/2018, Discharged on 01/24/2018   Component Date Value Ref Range Status    Ventricular Rate 01/21/2018 140  BPM Final    Atrial Rate 01/21/2018 140  BPM Final    P-R Interval 01/21/2018 128  ms Final    QRS Duration 01/21/2018 92  ms Final    Q-T Interval 01/21/2018 278  ms Final    QTC Calculation (Bezet) 01/21/2018 424  ms Final    Calculated P Axis 01/21/2018 34  degrees Final    Calculated R Axis 01/21/2018 20  degrees Final    Calculated T Axis 01/21/2018 5  degrees Final    Diagnosis 01/21/2018    Final                    Value:Sinus tachycardia vs atrial flutter with 2:1 block  Low voltage QRS  Incomplete right bundle branch block  Inferior infarct , age undetermined  Abnormal ECG  No previous ECGs available  Confirmed by Akiko Dinh MD, --- ([id number]) on 1/22/2018 2:46:08 PM      WBC 01/21/2018 10.3  4.6 - 13.2 K/uL Final    RBC 01/21/2018 5.20  4.70 - 5.50 M/uL Final    HGB 01/21/2018 14.0  13.0 - 16.0 g/dL Final    HCT 01/21/2018 41.8  36.0 - 48.0 % Final    MCV 01/21/2018 80.4  74.0 - 97.0 FL Final    MCH 01/21/2018 26.9  24.0 - 34.0 PG Final    MCHC 01/21/2018 33.5  31.0 - 37.0 g/dL Final    RDW 01/21/2018 16.6* 11.6 - 14.5 % Final    PLATELET 87/57/8997 993  135 - 420 K/uL Final    MPV 01/21/2018 9.9  9.2 - 11.8 FL Final    NEUTROPHILS 01/21/2018 64  40 - 73 % Final    LYMPHOCYTES 01/21/2018 26  21 - 52 % Final    MONOCYTES 01/21/2018 8  3 - 10 % Final    EOSINOPHILS 01/21/2018 2  0 - 5 % Final    BASOPHILS 01/21/2018 0  0 - 2 % Final    ABS. NEUTROPHILS 01/21/2018 6.6  1.8 - 8.0 K/UL Final    ABS. LYMPHOCYTES 01/21/2018 2.6  0.9 - 3.6 K/UL Final    ABS. MONOCYTES 01/21/2018 0.8  0.05 - 1.2 K/UL Final    ABS. EOSINOPHILS 01/21/2018 0.2  0.0 - 0.4 K/UL Final    ABS. BASOPHILS 01/21/2018 0.0  0.0 - 0.1 K/UL Final    DF 01/21/2018 AUTOMATED    Final    Sodium 01/21/2018 143  136 - 145 mmol/L Final    Potassium 01/21/2018 3.4* 3.5 - 5.5 mmol/L Final    Chloride 01/21/2018 107  100 - 108 mmol/L Final    CO2 01/21/2018 28  21 - 32 mmol/L Final    Anion gap 01/21/2018 8  3.0 - 18 mmol/L Final    Glucose 01/21/2018 97  74 - 99 mg/dL Final    BUN 01/21/2018 13  7.0 - 18 MG/DL Final    Creatinine 01/21/2018 0.98  0.6 - 1.3 MG/DL Final    BUN/Creatinine ratio 01/21/2018 13  12 - 20   Final    GFR est AA 01/21/2018 >60  >60 ml/min/1.73m2 Final    GFR est non-AA 01/21/2018 >60  >60 ml/min/1.73m2 Final    Comment: (NOTE)  Estimated GFR is calculated using the Modification of Diet in Renal   Disease (MDRD) Study equation, reported for both  Americans   (GFRAA) and non- Americans (GFRNA), and normalized to 1.73m2   body surface area. The physician must decide which value applies to   the patient. The MDRD study equation should only be used in   individuals age 25 or older.  It has not been validated for the   following: pregnant women, patients with serious comorbid conditions,   or on certain medications, or persons with extremes of body size,   muscle mass, or nutritional status.  Calcium 01/21/2018 8.8  8.5 - 10.1 MG/DL Final    Bilirubin, total 01/21/2018 0.6  0.2 - 1.0 MG/DL Final    ALT (SGPT) 01/21/2018 30  16 - 61 U/L Final    AST (SGOT) 01/21/2018 39* 15 - 37 U/L Final    Alk. phosphatase 01/21/2018 80  45 - 117 U/L Final    Protein, total 01/21/2018 7.8  6.4 - 8.2 g/dL Final    Albumin 01/21/2018 3.1* 3.4 - 5.0 g/dL Final    Globulin 01/21/2018 4.7* 2.0 - 4.0 g/dL Final    A-G Ratio 01/21/2018 0.7* 0.8 - 1.7   Final    Prothrombin time 01/21/2018 12.8  11.5 - 15.2 sec Final    INR 01/21/2018 1.0  0.8 - 1.2   Final    Comment:            INR Therapeutic Ranges         (on stable oral anticoagulant):     INDICATION                INR  DVT/PE/Atrial Fib          2.0-3.0  MI/Mechanical Heart Valve  2.5-3.5      Ventricular Rate 01/22/2018 87  BPM Final    Atrial Rate 01/22/2018 87  BPM Final    P-R Interval 01/22/2018 142  ms Final    QRS Duration 01/22/2018 82  ms Final    Q-T Interval 01/22/2018 380  ms Final    QTC Calculation (Bezet) 01/22/2018 457  ms Final    Calculated P Axis 01/22/2018 36  degrees Final    Calculated R Axis 01/22/2018 -6  degrees Final    Calculated T Axis 01/22/2018 1  degrees Final    Diagnosis 01/22/2018    Final                    Value:Normal sinus rhythm  Inferior infarct (cited on or before 21-JAN-2018)  Abnormal ECG  When compared with ECG of 21-JAN-2018 13:56,  Vent. rate has decreased BY  53 BPM  ST no longer depressed in Lateral leads  Confirmed by Juanpablo Lee MD, --- ([id number]) on 1/22/2018 3:03:48 PM      NT pro-BNP 01/21/2018 189  0 - 900 PG/ML Final    Comment:           For patients with dyspnea, NT proBNP is highly sensitive for detecting acute congestive heart failure.  Also, an NT proBNP <300 pg/mL effectively rules out acute congestive heart failure, with 99% negative predictive value. Our reference ranges are for acute dyspnea. Age              Range (pg/ml)                            0-49                0-450        50-75               0-900        >75                 0-1800       For ambulatory office patients, the ranges below apply: For patients with dyspnea, NT proBNP is highly sensitive for detecting acute congestive heart failure. Also, an NT proBNP <300 pg/mL effectively rules out acute congestive heart failure, with 99% negative predictive value. Our reference ranges are for acute dyspnea.  CK 01/21/2018 37* 39 - 308 U/L Final    CK - MB 01/21/2018 <1.0  <3.6 ng/ml Final    CK-MB Index 01/21/2018 CALCULATION NOT PERFORMED WHEN RESULT IS BELOW LINEAR LIMIT  0.0 - 4.0 % Final    Troponin-I, Qt. 01/21/2018 0.11* 0.0 - 0.045 NG/ML Final    Comment: ED Critical Value  The presence of detectable troponin above the reference range indicates myocardial injury which may be due to ischemia, myocarditis, trauma, etc.  Clinical correlation is necessary to establish the significance of this finding. Sequential testing is recommended to determine if the typical rise and fall of cTnI is demonstrated. Note:  Cardiac troponin I has a relatively long half life and may be present well after the CK MB has returned to baseline. The reference range is based on the 99th percentile of the referent population. Critical value verified.  Called to and read back by:  A SHELLY RN ER ON 1/21/18 AT 1455 BY DELMI      aPTT 01/21/2018 23.0  23.0 - 36.4 SEC Final    aPTT 01/21/2018 41.0* 23.0 - 36.4 SEC Final    CK 01/21/2018 44  39 - 308 U/L Final    CK - MB 01/21/2018 2.5  <3.6 ng/ml Final    CK-MB Index 01/21/2018 5.7* 0.0 - 4.0 % Final    Troponin-I, Qt. 01/21/2018 1.08* 0.0 - 0.045 NG/ML Final    Comment: ED Critical Value  The presence of detectable troponin above the reference range indicates myocardial injury which may be due to ischemia, myocarditis, trauma, etc.  Clinical correlation is necessary to establish the significance of this finding. Sequential testing is recommended to determine if the typical rise and fall of cTnI is demonstrated. Note:  Cardiac troponin I has a relatively long half life and may be present well after the CK MB has returned to baseline. The reference range is based on the 99th percentile of the referent population. CALLED TO AND CORRECTLY REPEATED BY:  [email or web address] BY BWB TO ED ANGELA POLANCO      aPTT 01/22/2018 >180.0* 23.0 - 36.4 SEC Final    Comment: CALLED TO AND CORRECTLY REPEATED BY:  [email or web address] BY BWB TO ED ANGELA JARAMILLO      Sodium 01/22/2018 141  136 - 145 mmol/L Final    Potassium 01/22/2018 3.4* 3.5 - 5.5 mmol/L Final    Chloride 01/22/2018 106  100 - 108 mmol/L Final    CO2 01/22/2018 28  21 - 32 mmol/L Final    Anion gap 01/22/2018 7  3.0 - 18 mmol/L Final    Glucose 01/22/2018 86  74 - 99 mg/dL Final    BUN 01/22/2018 13  7.0 - 18 MG/DL Final    Creatinine 01/22/2018 0.77  0.6 - 1.3 MG/DL Final    BUN/Creatinine ratio 01/22/2018 17  12 - 20   Final    GFR est AA 01/22/2018 >60  >60 ml/min/1.73m2 Final    GFR est non-AA 01/22/2018 >60  >60 ml/min/1.73m2 Final    Calcium 01/22/2018 7.9* 8.5 - 10.1 MG/DL Final    NT pro-BNP 01/22/2018 1334* 0 - 900 PG/ML Final    Comment:           For patients with dyspnea, NT proBNP is highly sensitive for detecting acute congestive heart failure. Also, an NT proBNP <300 pg/mL effectively rules out acute congestive heart failure, with 99% negative predictive value. Our reference ranges are for acute dyspnea. Age              Range (pg/ml)                            0-49                0-450        50-75               0-900        >75                 0-1800       For ambulatory office patients, the ranges below apply: For patients with dyspnea, NT proBNP is highly sensitive for detecting acute congestive heart failure.  Also, an NT proBNP <300 pg/mL effectively rules out acute congestive heart failure, with 99% negative predictive value. Our reference ranges are for acute dyspnea.  Prothrombin time 01/22/2018 15.5* 11.5 - 15.2 sec Final    INR 01/22/2018 1.3* 0.8 - 1.2   Final    Comment:            INR Therapeutic Ranges         (on stable oral anticoagulant):     INDICATION                INR  DVT/PE/Atrial Fib          2.0-3.0  MI/Mechanical Heart Valve  2.5-3.5      CK 01/22/2018 47  39 - 308 U/L Final    CK - MB 01/22/2018 2.7  <3.6 ng/ml Final    CK-MB Index 01/22/2018 5.7* 0.0 - 4.0 % Final    Troponin-I, Qt. 01/22/2018 0.91* 0.0 - 0.045 NG/ML Final    Comment: ED Critical Value  The presence of detectable troponin above the reference range indicates myocardial injury which may be due to ischemia, myocarditis, trauma, etc.  Clinical correlation is necessary to establish the significance of this finding. Sequential testing is recommended to determine if the typical rise and fall of cTnI is demonstrated. Note:  Cardiac troponin I has a relatively long half life and may be present well after the CK MB has returned to baseline. The reference range is based on the 99th percentile of the referent population. CALLED TO AND CORRECTLY REPEATED BY:  [email or web address] BY GUNJAN TO ED ANGELA LOPEZ      Magnesium 01/22/2018 1.9  1.6 - 2.6 mg/dL Final    Phosphorus 01/22/2018 3.1  2.5 - 4.9 MG/DL Final    aPTT 01/22/2018 149.3* 23.0 - 36.4 SEC Final    CK 01/22/2018 49  39 - 308 U/L Final    CK - MB 01/22/2018 2.2  <3.6 ng/ml Final    CK-MB Index 01/22/2018 4.5* 0.0 - 4.0 % Final    Troponin-I, Qt. 01/22/2018 0.48* 0.0 - 0.045 NG/ML Final    Comment: ED Critical Value  The presence of detectable troponin above the reference range indicates myocardial injury which may be due to ischemia, myocarditis, trauma, etc.  Clinical correlation is necessary to establish the significance of this finding.   Sequential testing is recommended to determine if the typical rise and fall of cTnI is demonstrated. Note:  Cardiac troponin I has a relatively long half life and may be present well after the CK MB has returned to baseline. The reference range is based on the 99th percentile of the referent population.   CALLED TO AND CORRECTLY REPEATED BY:  SIMON CONDON RN IN ED AT 1217 ON 01/22/18 TO [facility]      aPTT 01/22/2018 69.9* 23.0 - 36.4 SEC Final    aPTT 01/22/2018 70.6* 23.0 - 36.4 SEC Final    Sodium 01/23/2018 143  136 - 145 mmol/L Final    Potassium 01/23/2018 3.5  3.5 - 5.5 mmol/L Final    Chloride 01/23/2018 106  100 - 108 mmol/L Final    CO2 01/23/2018 31  21 - 32 mmol/L Final    Anion gap 01/23/2018 6  3.0 - 18 mmol/L Final    Glucose 01/23/2018 89  74 - 99 mg/dL Final    BUN 01/23/2018 8  7.0 - 18 MG/DL Final    Creatinine 01/23/2018 0.84  0.6 - 1.3 MG/DL Final    BUN/Creatinine ratio 01/23/2018 10* 12 - 20   Final    GFR est AA 01/23/2018 >60  >60 ml/min/1.73m2 Final    GFR est non-AA 01/23/2018 >60  >60 ml/min/1.73m2 Final    Calcium 01/23/2018 8.4* 8.5 - 10.1 MG/DL Final    Prothrombin time 01/23/2018 13.7  11.5 - 15.2 sec Final    INR 01/23/2018 1.1  0.8 - 1.2   Final    Comment:            INR Therapeutic Ranges         (on stable oral anticoagulant):     INDICATION                INR  DVT/PE/Atrial Fib          2.0-3.0  MI/Mechanical Heart Valve  2.5-3.5      aPTT 01/23/2018 141.9* 23.0 - 36.4 SEC Final    Prostate Specific Ag 01/23/2018 0.9  0.0 - 4.0 ng/mL Final    Antiphosphatidylserine IgM 01/23/2018 5  0 - 25 MPS IgM Final    Antiphosphatidylserine IgA 01/23/2018 2  0 - 20 APS IgA Final    Antiphosphatidylserine IgG 01/23/2018 2  0 - 11 GPS IgG Final    Comment: (NOTE)  Performed At: [address] [id number]  Kishor Mcpherson MD RL:[id number]      Factor V Leiden 01/23/2018 Comment*   Final    Comment: (NOTE)  RESULT: SINGLE R506Q MUTATION IDENTIFIED (HETEROZYGOTE)  Factor V Leiden is a specific mutation (R506Q) in the factor V gene  that is associated with an increased risk of venous thrombosis. Factor V Leiden is more resistant to inactivation by activated  protein C. As a result, factor V persists in the circulation leading  to a mild hypercoagulable state. Factor V Leiden has been reported  in patients with deep vein thrombosis, pulmonary embolus, central  retinal vein occulsion, cerebral sinus thrombosis, and hepatic vein  thrombosis. The relative risk of venous thrombosis is increased  approximately 4-8 fold in individuals who are heterozygous. About 3-  8% of the general [place] and  population are heterozygous. The  risk of venous thrombosis increases exponentially in patients with  more than one risk factor, including:  age, surgery, oral  contraceptive use, pregnancy, elevated homocysteine levels, or a  Factor II/prothrombin mutation ([id number]). Additionally, for  i                           ndividuals found to be heterozygous for the Factor V Leiden  mutation, presence of a second mutation, Factor V R2, further  increases the risk if venous thrombosis. Contact [facility] Genetics  Customer Service at [phone] for further information on both  the Factor II (Prothrombin) DNA Analysis, and Factor V R2 DNA  Analysis tests.  Comment 01/23/2018 Comment    Final    Comment: (NOTE)  **Genetic counselors are available for health care providers to**   discuss results at [phone]). Methodology:  DNA analysis of the Factor V gene was performed by allele-specific  PCR. The diagnostic sensitivity and specificity is >99% for both. Molecular-based testing is highly accurate, but as in any  laboratory test, diagnostic errors may occur. All test results must  be combined with clinical information for the most accurate  interpretation.   This test was developed and its performance characteristics  determined by [facility]. It has not been cleared or approved by the  Food and Drug Administration. References:  Juan MILES (1996). Clin Lab Med 97:666-132. Tama Opitz, PhD, Dawood Delong, PhD, Paola Cobian, PhD, Keila Shane M.S., PhD, Nimisha Cantrell, PhD, Aarti Hogan, PhD, Arlet Ortiz PhD, [facility], [facility].  Performed At: [address] [id number]  Pema Simms JV:[id number]      KVH-DK 01/23/2018 33.9  0.0 - 51.9 sec Final    dRVVT 01/23/2018 41.4  0.0 - 47.0 sec Final    Comment: (NOTE)  Performed At: [address] [id number]  Chel Mathur MD MW:[id number]      Interpretation 01/23/2018 Comment:   Final    Comment: (NOTE)  No lupus anticoagulant was detected. Performed At: [address] [id number]  Chel Mathur MD EV:[id number]      Factor II, DNA Analysis 01/23/2018 Comment    Final    Comment: (NOTE)  NEGATIVE  No mutation identified. Comment:  A point mutation ([id number]) in the factor II (prothrombin) gene is  the second most common cause of inherited thrombophilia. The  incidence of this mutation in the [place]  population is about  2% and in the [place] population it is approximately 0.5%. This mutation is rare in the [place] and  population. Being heterozygous for a prothrombin mutation increases the risk for  developing venous thrombosis about 2 to 3 times above the general  population risk. Being homozygous for the prothrombin gene mutation  increases the relative risk for venous thrombosis further, although  it is not yet known how much further the risk is increased. In women  heterozygous for the prothrombin gene mutation, the use of estrogen  containing oral contraceptives increases the relative risk of venous  thrombosis about 16 times and the risk of developing cerebral  thrombosis is also significantly increased.  In pregnancy the  pr                           othrombin gene mutation increases risk for venous thrombosis and  may increase risk for stillbirth, placental abruption, pre-eclampsia  and fetal growth restriction. If the patient possesses two or more  congenital or acquired thrombophilic risk factors, the risk for  thrombosis may rise to more than the sum of the risk ratios for the  individual mutations. This assay detects only the prothrombin [id number]  mutation and does not measure genetic abnormalities elsewhere in the  genome. Other thrombotic risk factors may be pursued through  systematic clinical laboratory analysis. These factors include the  R506Q (Leiden) mutation in the Factor V gene, plasma homocysteine  levels, as well as testing for deficiencies of antithrombin III,  protein C and protein S.      Additional information 01/23/2018 Comment    Final    Comment: (NOTE)  Genetic Counselors are available for health care providers  to discuss results at [phone]). Methodology:  DNA analysis of the Factor II gene was performed by PCR  amplification followed by restriction analysis. The  diagnostic sensitivity is >99% for both. All the tests must  be combined with clinical information for the most accurate  interpretation. Molecular-based testing is highly accurate,  but as in any laboratory test, diagnostic errors may occur. This test was developed and its performance characteristics  determined by [facility]. It has not been cleared or approved  by the Food and Drug Administration. Poort SR, et al. Blood. 1996; 26:3649-7939. Joe Turk EA. Circulation. 2004; 368:X45-J86. yohana Lopez [address];  19:700-703.   Flynn Nichols, PhD, Patricia Steele, PhD, Amber Lees, PhD, Ross Palafox MMarcSMarc, PhD, Jesus Braden, PhD, Taya Tobar, PhD, Magdiel Estevez,                            PhD, [facility]  Performed At: [facility]  [address], [place] [id number]  Melvin Patel MD YO:[id number]      aPTT 01/23/2018 28.4  23.0 - 36.4 SEC Final    Sodium 01/24/2018 140  136 - 145 mmol/L Final    Potassium 01/24/2018 3.4* 3.5 - 5.5 mmol/L Final    Chloride 01/24/2018 104  100 - 108 mmol/L Final    CO2 01/24/2018 30  21 - 32 mmol/L Final    Anion gap 01/24/2018 6  3.0 - 18 mmol/L Final    Glucose 01/24/2018 83  74 - 99 mg/dL Final    BUN 01/24/2018 6* 7.0 - 18 MG/DL Final    Creatinine 01/24/2018 0.79  0.6 - 1.3 MG/DL Final    BUN/Creatinine ratio 01/24/2018 8* 12 - 20   Final    GFR est AA 01/24/2018 >60  >60 ml/min/1.73m2 Final    GFR est non-AA 01/24/2018 >60  >60 ml/min/1.73m2 Final    Calcium 01/24/2018 8.6  8.5 - 10.1 MG/DL Final    Prothrombin time 01/24/2018 15.1  11.5 - 15.2 sec Final    INR 01/24/2018 1.2  0.8 - 1.2   Final    Comment:            INR Therapeutic Ranges         (on stable oral anticoagulant):     INDICATION                INR  DVT/PE/Atrial Fib          2.0-3.0  MI/Mechanical Heart Valve  2.5-3.5       Imaging:  I have personally reviewed the patients radiographs and have reviewed the reports:     No results found for this or any previous visit. Results from [place] encounter on 01/21/18   CT ABD PELV W CONT   Narrative CT ABDOMEN AND PELVIS WITH ENHANCEMENT    INDICATION: Admitted with acute massive bilateral pulmonary emboli, hypotension,  hypoxia, right heart strain, lower extremity DVT, hidradenitis suppurative on  antibiotics with concern for underlying malignancy. .    TECHNIQUE: Axial images obtained of the abdomen and pelvis following the  uneventful administration of  100 cc's Isovue-300 nonionic intravenous contrast.  Coronal and sagittal reformatted images of the abdomen and pelvis were  obtained.     All CT scans at this facility are performed using dose optimization technique as  appropriate to a performed exam, to include automated exposure control,  adjustment of the mA and/or kV according to patient size (including appropriate  matching first site-specific examinations), or use of iterative reconstruction  technique. COMPARISON: CT chest 1/21/2018. ABDOMEN FINDINGS:     Liver: Unremarkable. Spleen: Unremarkable. Pancreas: Unremarkable. Biliary: Unremarkable. Bowel: Unremarkable. Peritoneum/ Retroperitoneum: Unremarkable. Lymph Nodes: Unremarkable. Adrenal Glands: Unremarkable. Kidneys: Unremarkable. Vessels: Aorta is normal in caliber with mild atherosclerosis. PELVIS FINDINGS:     Bladder/ Pelvic Organs: Unremarkable. Lung Base: Pulmonary emboli is again noted at the right lower lobe and branching  of the right mainstem bronchus. New Trace bilateral pleural effusions. Mild  subpleural groundglass right lower lobe and dependent right middle lobe. Bones: Lower lumbar facet hypertrophy and arthropathy. Leftward subluxation of  L3 on L4 with severe degenerative disc disease. Narrowing bilateral hip joint  spaces. Age-indeterminate L3 compression fracture. Impression IMPRESSION:    1. No primary malignancy identified abdomen/pelvis. 2.  Persistent right saddle embolus. Incompletely evaluated left hilum. 3.  New mild right subpleural groundglass may reflect infarct and/or  atelectasis. 4.  Age-indeterminate L3 compression fracture. ECHO 1/22/2018   IMPRESSIONS:  A clinically significant myopathic process is ruled out. The findings suggest   the possibility of pulmonary embolism. Features were consistent with pulmonary hypertension. SUMMARY:  Left ventricle: Systolic function was normal by visual assessment. Ejection   fraction was estimated in the range of 55 %  to 60 %. No obvious wall motion abnormalities identified in the views   obtained. Right ventricle: The ventricle was moderately dilated. Systolic pressure was   mildly increased. Estimated peak pressure  was 37 mmHg.  There was a structure in the RV apex, could not exclude   moderator band. Right atrium: The atrium was moderately dilated. Inferior vena cava, hepatic veins: The inferior vena cava was dilated. Results for orders placed or performed during the hospital encounter of 01/21/18   EKG, 12 LEAD, INITIAL   Result Value Ref Range    Ventricular Rate 87 BPM    Atrial Rate 87 BPM    P-R Interval 142 ms    QRS Duration 82 ms    Q-T Interval 380 ms    QTC Calculation (Bezet) 457 ms    Calculated P Axis 36 degrees    Calculated R Axis -6 degrees    Calculated T Axis 1 degrees    Diagnosis       Normal sinus rhythm  Inferior infarct (cited on or before 21-JAN-2018)  Abnormal ECG  When compared with ECG of 21-JAN-2018 13:56,  Vent. rate has decreased BY  53 BPM  ST no longer depressed in Lateral leads  Confirmed by Juanpablo Church MD, --- ([id number]) on 1/22/2018 3:03:48 PM       Duplex BLE 1/23/2018  Right Leg:-  Deep venous thrombosis:           Yes  Proximal extent of thrombus:      Posterior Tibial  Superficial venous thrombosis:    No  Deep venous insufficiency:        Not examined  Superficial venous insufficiency: Not examined     Left Leg:-  Deep venous thrombosis:           No  Superficial venous thrombosis:    No  Deep venous insufficiency:        Not examined  Superficial venous insufficiency: Not examined        INTERPRETATION/FINDINGS  Duplex images were obtained using 2-D gray scale, color flow, and  spectral Doppler analysis. Right leg :  1. Acute and occlusive deep venous thrombosis identified in the  posterior tibial and peroneal veins. 2. Deep veins visualized include the common femoral, femoral,  popliteal, posterior tibial and peroneal veins. 3. Superficial veins visualized include the great saphenous vein. 4. No evidence of superficial thrombosis detected. Left leg :  1. No evidence of deep venous thrombosis detected in the veins  visualized.   2. Deep veins visualized include the common femoral, femoral,  popliteal, posterior tibial and peroneal veins. 3. Superficial veins visualized include the great saphenous vein. 4. No evidence of superficial thrombosis detected.     Duplex BUE 1/23/2018  Right Arm:-  Deep venous thrombosis:           No  Superficial venous thrombosis:    No  Left Arm:-  Deep venous thrombosis:           No  Superficial venous thrombosis:    No  INTERPRETATION/FINDINGS  Duplex images were obtained using 2-D gray scale, color flow, and  spectral Doppler analysis. Right arm :  1. No evidence of deep venous thrombosis detected in the veins  visualized. 2. Deep vein(s) visualized include the internal jugular, subclavian,  axillary and brachial veins. 3. No evidence of superficial thrombosis detected. 4. Superficial vein(s) visualized include the basilic (upper arm) and  cephalic (upper arm) veins. Left arm :  1. No evidence of deep venous thrombosis detected in the veins  visualized. 2. Deep vein(s) visualized include the internal jugular, subclavian,  axillary and brachial veins. 3. No evidence of superficial thrombosis detected. 4. Superficial vein(s) visualized include the basilic (upper arm) and  cephalic (upper arm) veins.        Mr. Reaves has a reminder for a \"due or due soon\" health maintenance. I have asked that he contact his primary care provider for follow-up on this health maintenance. IMPRESSION:   · Abnormal imaging likely due to pulmonary embolism infarct vs atelectasis. · New DVT in RLE, S/P IVC filter 1/23/2018. · Bilateral pulmonary embolism with heavy burden of clot, s/p TPA, treated heparin infusion during course of hospitalization, currently on Elliquis 5 mg BID.   · Factor V Leiden positive/hypercoagulable state-scheduled to see Hematologist.  · Hx prior PE with tPA treatment acutely and on coumadin x1 year (about 5 years ago)/  · Hx LLE DVT per pt hx, negative on last duplex study  · Former smoker, quit 2004, used to smoked 1 1/2 PPD,  started smoking at age 15 yr old.   · Chronic hidradenitis left groin and left axilla. Previously on Humira injection/Prednisone-follow by Dr Jerardo Galvin (Dermatologist). RECOMMENDATIONS:   · Repeat CTA chest to assess resolution in 3 months  · Continue Elliquis 5  Mg BID, instructed pt/wife monitor for any bleeding, for gross bleeding go to hospital/or call 911. D/w pt/wife pt will be on anticoagulation lifelong-both states undersatnding. · Follow up as scheduled with Hematologist and other consultants. · Pt/wife wants repeat RLE duplex to assess resolution of DVT also concerned when to remove the IVC filter-told  in 6 mos-defer with hematologist/vascular/Dr Portia Santana for further discussion on next visit. 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 · Pulmonary toilette, encouraged deep breathing/IS as tolearted. · Hospital records, imaging reviewed with Dr Katherin Berry with above plan and summarized. · Reviewed all medications and discussed concerns, answered questions. · Follow-up 3 months with Dr Portia Santana after repeat CTA chest, will also obtain PFT due to intermittent wheezing/pt is a former smoker or come back sooner should new symptoms or problems arise.        Marjan Barth NP

## 2018-02-08 NOTE — MR AVS SNAPSHOT
[address] [facility], [address] [place] [address] 
[phone] Patient: Tanna Pang MRN: [id number] :1965 Visit Information Date & Time Provider Department Dept. Phone Encounter #  
 2018  9:30 AM Amina Bain NP [facility] Alexei blum [phone] Follow-up Instructions Return in about 3 months (around 2018) for Dr Camryn Chiu after CT chest and PFT on visit. Upcoming Health Maintenance Date Due Hepatitis C Screening 1965 DTaP/Tdap/Td series (1 - Tdap) 10/14/1986 FOBT Q 1 YEAR AGE 50-75 10/14/2015 Influenza Age 5 to Adult 2017 Allergies as of 2018  Review Complete On: 2018 By: Chanda El No Known Allergies Current Immunizations  Never Reviewed No immunizations on file. Not reviewed this visit Your Updated Medication List  
  
   
This list is accurate as of: 18 10:46 AM.  Always use your most recent med list.  
  
  
  
  
 apixaban 5 mg tablet Commonly known as:  French Barrios Start this medication on AFTER Elequis 10 mg dose has been completed  Indications: pulmonary thromboembolism  
  
 hydroCHLOROthiazide 25 mg tablet Commonly known as:  HYDRODIURIL Take 1 Tab by mouth daily. Follow-up Instructions Return in about 3 months (around 2018) for Dr Camryn Chiu after CT chest and PFT on visit. Patient Instructions Await call for CT chest 
 
 
  
Introducing [facility] & HEALTH SERVICES! Dear Nico Coe: 
Thank you for requesting a Servio account. Our records indicate that you already have an active Servio account. You can access your account anytime at [email or web address] Did you know that you can access your hospital and ER discharge instructions at any time in Servio? You can also review all of your test results from your hospital stay or ER visit. Additional Information If you have questions, please visit the Frequently Asked Questions section of the [email or web address] website at [email or web address] Remember, CareSimply is NOT to be used for urgent needs. For medical emergencies, dial 911. Now available from your iPhone and Android! Please provide this summary of care documentation to your next provider. Your primary care clinician is listed as PROVIDER UNKNOWN. If you have any questions after today's visit, please call [phone].

## 2018-04-02 ENCOUNTER — HOSPITAL ENCOUNTER (OUTPATIENT)
Dept: CT IMAGING | Age: 53
Discharge: HOME | End: 2018-04-02
Attending: NURSE PRACTITIONER
Payer: OTHER GOVERNMENT

## 2018-04-02 DIAGNOSIS — I26.92 SADDLE EMBOLUS OF PULMONARY ARTERY WITHOUT ACUTE COR PULMONALE, UNSPECIFIED CHRONICITY (HCC): ICD-10-CM

## 2018-04-02 DIAGNOSIS — R93.89 ABNORMAL FINDING ON IMAGING: ICD-10-CM

## 2018-04-02 LAB — CREAT UR-MCNC: 0.8 MG/DL (ref 0.6–1.3)

## 2018-04-02 PROCEDURE — 74011636320 HC RX REV CODE- 636/320: Performed by: NURSE PRACTITIONER

## 2018-04-02 PROCEDURE — 71275 CT ANGIOGRAPHY CHEST: CPT

## 2018-04-02 PROCEDURE — 82565 ASSAY OF CREATININE: CPT

## 2018-04-02 RX ADMIN — IOPAMIDOL 100 ML: 755 INJECTION, SOLUTION INTRAVENOUS at 14:21

## 2018-04-10 ENCOUNTER — OFFICE VISIT (OUTPATIENT)
Dept: PULMONOLOGY | Age: 53
End: 2018-04-10

## 2018-04-10 VITALS
WEIGHT: 250 LBS | BODY MASS INDEX: 33.13 KG/M2 | DIASTOLIC BLOOD PRESSURE: 90 MMHG | OXYGEN SATURATION: 98 % | HEART RATE: 95 BPM | HEIGHT: 73 IN | RESPIRATION RATE: 21 BRPM | TEMPERATURE: 97.8 F | SYSTOLIC BLOOD PRESSURE: 142 MMHG

## 2018-04-10 DIAGNOSIS — R06.2 WHEEZING: ICD-10-CM

## 2018-04-10 DIAGNOSIS — R93.89 ABNORMAL FINDING ON IMAGING: ICD-10-CM

## 2018-04-10 DIAGNOSIS — I82.493 ACUTE DEEP VEIN THROMBOSIS (DVT) OF OTHER SPECIFIED VEIN OF BOTH LOWER EXTREMITIES (HCC): ICD-10-CM

## 2018-04-10 DIAGNOSIS — R91.1 PULMONARY NODULE: Primary | ICD-10-CM

## 2018-04-10 DIAGNOSIS — I26.99 OTHER ACUTE PULMONARY EMBOLISM WITHOUT ACUTE COR PULMONALE (HCC): ICD-10-CM

## 2018-04-10 DIAGNOSIS — Z87.891 FORMER SMOKER: ICD-10-CM

## 2018-04-10 RX ORDER — DICLOFENAC SODIUM 10 MG/G
GEL TOPICAL 4 TIMES DAILY
COMMUNITY

## 2018-04-10 RX ORDER — CLINDAMYCIN HYDROCHLORIDE 300 MG/1
300 CAPSULE ORAL 3 TIMES DAILY
Status: ON HOLD | COMMUNITY
End: 2018-11-09

## 2018-04-10 RX ORDER — PREDNISONE 5 MG/1
TABLET ORAL
Status: ON HOLD | COMMUNITY
End: 2018-11-09

## 2018-04-10 NOTE — MR AVS SNAPSHOT
[address] [facility], [address] [address] 
[phone] Patient: Kenrick Santos MRN: [id number] :1965 Visit Information Date & Time Provider Department Dept. Phone Encounter #  
 4/10/2018  3:45 PM MD Paula Arnold  Pulmonary [facility] [phone] Follow-up Instructions Return in about 3 months (around 7/10/2018). Upcoming Health Maintenance Date Due Hepatitis C Screening 1965 DTaP/Tdap/Td series (1 - Tdap) 10/14/1986 FOBT Q 1 YEAR AGE 50-75 10/14/2015 Influenza Age 5 to Adult 2017 Allergies as of 4/10/2018  Review Complete On: 4/10/2018 By: RT Bo No Known Allergies Current Immunizations  Never Reviewed No immunizations on file. Not reviewed this visit You Were Diagnosed With   
  
 Codes Comments Pulmonary nodule    -  Primary ICD-10-CM: R91.1 ICD-9-CM: 793.11 Wheezing     ICD-10-CM: R06.2 ICD-9-CM: 786.07 Former smoker     ICD-10-CM: Q22.964 ICD-9-CM: V15.82 Abnormal finding on imaging     ICD-10-CM: R93.8 ICD-9-CM: 793.99 Other acute pulmonary embolism without acute cor pulmonale (HCC)     ICD-10-CM: I26.99 
ICD-9-CM: 415.19 Acute deep vein thrombosis (DVT) of other specified vein of both lower extremities (HCC)     ICD-10-CM: D93.915 ICD-9-CM: 453.40 Vitals BP Pulse Temp Resp Height(growth percentile) Weight(growth percentile) 142/90 (BP 1 Location: Left arm, BP Patient Position: At rest) 95 97.8 °F (36.6 °C) (Oral) 21 6' 1\" (1.854 m) 250 lb (113.4 kg) SpO2 BMI Smoking Status 98% 32.98 kg/m2 Former Smoker BMI and BSA Data Body Mass Index Body Surface Area 32.98 kg/m 2 2.42 m 2 Your Updated Medication List  
  
   
This list is accurate as of 4/10/18  4:51 PM.  Always use your most recent med list.  
  
  
  
  
 amLODIPine 10 mg tab 10 mg, valsartan 320 mg tab 320 mg Take  by mouth daily. apixaban 5 mg tablet Commonly known as:  Asif Oreilly Start this medication on 1/30/AFTER Elequis 10 mg dose has been completed  Indications: pulmonary thromboembolism  
  
 clindamycin 300 mg capsule Commonly known as:  CLEOCIN Take 300 mg by mouth three (3) times daily. diclofenac 1 % Gel Commonly known as:  VOLTAREN Apply  to affected area four (4) times daily. predniSONE 5 mg tablet Commonly known as:  Genia Look Take  by mouth. We Performed the Following AMB POC PFT COMPLETE W/BRONCHODILATOR [14991 CPT(R)] DIFFUSING CAPACITY [id number] CPT(R)] GAS DILUT/WASHOUT LUNG VOL W/WO DISTRIB VENT&VOL [05124 CPT(R)] Follow-up Instructions Return in about 3 months (around 7/10/2018). To-Do List   
 04/10/2018 Imaging:  DUPLEX LOWER EXT VENOUS BILAT   
  
 07/10/2018 Imaging:  CT CHEST W CONT Introducing \[facility]\"" & HEALTH SERVICES! Dear Bianca Neff: 
Thank you for requesting a Real Savvy account. Our records indicate that you already have an active Real Savvy account. You can access your account anytime at [email or web address] Did you know that you can access your hospital and ER discharge instructions at any time in Real Savvy? You can also review all of your test results from your hospital stay or ER visit. Additional Information If you have questions, please visit the Frequently Asked Questions section of the Real Savvy website at [email or web address] Remember, Real Savvy is NOT to be used for urgent needs. For medical emergencies, dial 911. Now available from your iPhone and Android! Please provide this summary of care documentation to your next provider. Your primary care clinician is listed as Lisette Mendoza. If you have any questions after today's visit, please call [phone].

## 2018-04-10 NOTE — PROGRESS NOTES
[address] [place] [address], [address] [place] [address],[address] [place] [address] [facility]  Pulmonary, Critical Care, and Sleep Medicine    Pulmonary Office Followup  Name: Chirag Reaves 46 y.o. male  MRN: [id number]  : 1965  Service Date: 04/10/18  Chief Complaint:   Chief Complaint   Patient presents with    Shortness of Breath     Pulmonary Embolism       History of Present Illness:  Ryan Duvall is a 46 y.o. male, who presents to Pulmonary clinic for followup of VTE/PE. Pt last seen by Lilian CABRAL on 18. In the interval, pt denies any issues. He denies any respiratory dyspnea, fevers, chills, hemoptysis, sputum production cough. He reports decreased leg swelling, with no pain with ambulation and no claudication. Pt does report he is considering having surgery for hidradenitis supprativa. Pt has been compliant with Eliquis. Past Medical Hx: I have personally reviewed medical hx  Past Medical History:   Diagnosis Date    Pulmonary embolism ([facility] [address]        Past Surgical Hx: I have personally reviewed surgical hx  History reviewed. No pertinent surgical history. Family Hx: I have personally reviewed the family hx. No family hx of lung cancer or hereditary lung disease with immediate family. Family History   Problem Relation Age of Onset    No Known Problems Mother     Hypertension Father     Cancer Father     Stroke Father        Social Hx: I have personally reviewed the social hx. Social History     Social History    Marital status:      Spouse name: N/A    Number of children: N/A    Years of education: N/A     Occupational History    Not on file.      Social History Main Topics    Smoking status: Former Smoker     Packs/day: 1.00     Years: 30.00     Types: Cigarettes     Quit date: 2004    Smokeless tobacco: Never Used    Alcohol use Not on file    Drug use: Not on file    Sexual activity: Not on file     Other Topics Concern    Not on file Social History Narrative       Allergies: I have reviewed the allergy hx  No Known Allergies    Medications:  I have reviewed the patient's medications  Prior to Admission medications    Medication Sig Start Date End Date Taking? Authorizing Provider   clindamycin (CLEOCIN) 300 mg capsule Take 300 mg by mouth three (3) times daily. Yes Historical Provider   predniSONE (DELTASONE) 5 mg tablet Take  by mouth. Yes Historical Provider   diclofenac (VOLTAREN) 1 % gel Apply  to affected area four (4) times daily. Yes Historical Provider   amLODIPine 10 mg tab 10 mg, valsartan 320 mg tab 320 mg Take  by mouth daily. Yes Historical Provider   apixaban (ELIQUIS) 5 mg tablet Start this medication on 1/30/AFTER Elequis 10 mg dose has been completed  Indications: pulmonary thromboembolism 1/24/18  Yes Dora Evans MD       Immunizations:  I have reviewed the patient's immunizations    There is no immunization history on file for this patient. Review of Systems:  A complete review of systems was performed as stated in the HPI, all others are negative.       Objective:    Physical Exam:  /90 (BP 1 Location: Left arm, BP Patient Position: At rest)  Pulse 95  Temp 97.8 °F (36.6 °C) (Oral)   Resp 21  Ht 6' 1\" (1.854 m)  Wt 113.4 kg (250 lb)  SpO2 98%  BMI 32.98 kg/m2  Vitals were personally reviewed  Gen: no acute distress, pleasant and cooperative, sitting up in chair, able to climb to exam table w/o difficulty  HEENT: normocephalic/atraumatic, PERRLA, EOMI, no scleral icterus, nasal turbinates have no erythema, no nasal polyps, no oral lesions, good dentition, Mallampati IV  Neck: supple, trachea midline, no JVD, no cervical and supraclavicular adenopathy  Chest: no lesions, with even rise and fall, no pectus excavatum or flail chest  CVS: regular rate rhythm, S1/S2, no murmurs/rubs/gallops  Lungs: good air entry B/L, no wheezes/rales/rhonchi  Back: no kyphosis or scoliosis  Abdomen: soft, nontender, bowel sounds present, no hepatosplenomegaly  Ext: no pitting edema B/L, no peripheral cyanosis or clubbing  Neuro: grossly normal, AAOx3, normal strength and coordination grossly, no focal deficits  Skin: no rashes, erythema, lesions  Psych: normal memory, thought content, and processing    Labs: I have reviewed the patient's available labs  Lab Results   Component Value Date/Time    WBC 10.3 01/21/2018 02:07 PM    HGB 14.0 01/21/2018 02:07 PM    HCT 41.8 01/21/2018 02:07 PM    PLATELET 874 06/09/9086 02:07 PM    MCV 80.4 01/21/2018 02:07 PM     Lab Results   Component Value Date/Time    Sodium 140 01/24/2018 02:23 AM    Potassium 3.4 (L) 01/24/2018 02:23 AM    Chloride 104 01/24/2018 02:23 AM    CO2 30 01/24/2018 02:23 AM    Anion gap 6 01/24/2018 02:23 AM    Glucose 83 01/24/2018 02:23 AM    BUN 6 (L) 01/24/2018 02:23 AM    Creatinine 0.79 01/24/2018 02:23 AM    BUN/Creatinine ratio 8 (L) 01/24/2018 02:23 AM    GFR est AA >60 01/24/2018 02:23 AM    GFR est non-AA >60 01/24/2018 02:23 AM    Calcium 8.6 01/24/2018 02:23 AM       Imaging:  I have personally reviewed patient's imaging from CTA chest from 4/2 - shows no B/L effusions, and no large pulmonary emboli seen. Pt has one area in the [place] along the posterior that appears to be a spot of improving infarction. Official read per radiology  CT Results (most recent):    Results from Hospital Encounter encounter on 04/02/18   CTA CHEST W OR W WO CONT   Narrative PROCEDURE:  CT-CTA Chest with Contrast (CT Pulmonary Study for PE). INDICATION:  Bilateral PE. Follow-up evaluation. COMPARISON:  None. TECHNIQUE:  Helical volumetric 2.5 mm sections of the chest are obtained with CT  pulmonary angiogram protocol. Subsequently, sagittal and coronal multiplanar  reconstruction images are obtained.   Maximum intensity projection images are  generated to better delineate the pulmonary vasculature, differentiate between  the pulmonary arteries and veins and to increase sensitivity to pulmonary  emboli.      - IV contrast:  100 mL Isovue-370.  - Radiation dose:  DLP 1137 mGy-cm.  - Note:  Radiation dose optimization techniques are utilized as appropriate to  the exam, with combination of automated exposure control, adjustment of the mA  and/or kV according to patient's size (Including appropriate matching for  site-specific examinations), or use of iterative reconstruction technique. FINDINGS:     Pulmonary Arteries:      - The pulmonary artery opacification is diagnostic in quality.  - No pulmonary emboli are noted in the pulmonary arterial tree down to the level  of the segmental arteries. Previously observed pulmonary artery filling defects  are not seen on current examination. Lung, Pleura, Airways:      - In the superior segment of the left lower lobe, a new pleura-based nodular  structure is observed measuring 1.2 x 0.9 cm (axial #25). Notably, the 1/21/18  study demonstrated groundglass opacity in this region measuring about 2.3 x 1.6  cm.  - Right middle lobe reveals focal groundglass opacity in the posterior lateral  aspect (axial #38-44 sagittal #32). - No infiltrate or consolidation.  - No pleural effusion. Mediastinum, Dalia:  1.4 x 1.0 cm node adjacent to the aortic arch (axial #72),  similar to 1/21/18. Aorta:  No evidence of aortic dissection or aneurysm. Base of neck:  No acute findings. Axillae:  Unremarkable. Esophagus, Abdomen:  Slight esophageal prominence in the distal segment, similar  to 1/21/18. Incidental small note adjacent to the gastroesophageal junction  below the level of the diaphragm, measuring about 0.8 x 0.7 cm. Skeletal Structures:  No acute findings. Impression IMPRESSION:        1. No convincing evidence of significant residual pulmonary embolism. 2.  Evolving nodular density in the left lower lobe superior segment.   Now much  more solid appearing, previously ground-glass type density on 18. - Differential considerations of evolving appearance of pulmonary infarct vs.  post-inflammatory changes. Neoplastic process cannot be entirely excluded. Recommend follow-up CT in 3 months to document resolution. 3.  Persistent mild enlargement of the node adjacent to the aortic arch. Presumably reactive change. 4.  Stable mild prominence of the distal esophagus. TTE:  I have reviewed the patient's TTE results  Results for orders placed or performed during the hospital encounter of 18   2D ECHO COMPLETE ADULT (TTE) W OR WO CONTR     Status: None    [facility]  [facility], [address]  [phone]    Transthoracic Echocardiogram    Patient: Paulo Garland  MRN: [id number]  ACCT #: [de-identified]  : 1965  Age: 46 years  Gender: Male  Height: 73 in  Weight: 237.6 lb  BSA: 2.32 m-sq  BP: 151 / 99 mmHg  Study date: 2018  Status: Routine  Location: [address] [place] [address] #: [id number]    Allergies: NO KNOWN ALLERGIES    Referring_Ordering Physician:  [facility] Hospitalist  Interpreting Group:  [address] [place] [address]  Interpreting Physician:  Juanpablo Kong MD  Technologist:  MEREDITH Irene, TOD    IMPRESSIONS:  A clinically significant myopathic process is ruled out. The findings suggest   the possibility of pulmonary embolism. Features were consistent with pulmonary hypertension. SUMMARY:  Left ventricle: Systolic function was normal by visual assessment. Ejection   fraction was estimated in the range of 55 %  to 60 %. No obvious wall motion abnormalities identified in the views   obtained. Right ventricle: The ventricle was moderately dilated. Systolic pressure was   mildly increased. Estimated peak pressure  was 37 mmHg. There was a structure in the RV apex, could not exclude   moderator band. Right atrium: The atrium was moderately dilated. Inferior vena cava, hepatic veins:  The inferior vena cava was dilated. INDICATIONS: Chest pain, PE.    HISTORY: Prior history: Pulmonary embolism. PROCEDURE: This was a routine study. The study included complete 2D imaging,   M-mode, complete spectral Doppler, and  color Doppler. The heart rate was 74 bpm, at the start of the study. Systolic   blood pressure was 151 mmHg, at the start  of the study. Diastolic blood pressure was 99 mmHg, at the start of the   study. Images were obtained from the  parasternal, apical, subcostal, and suprasternal notch acoustic windows. Echocardiographic views were limited by poor  acoustic window availability. This was a technically difficult study. LEFT VENTRICLE: Size was normal. Systolic function was normal by visual   assessment. Ejection fraction was estimated in  the range of 55 % to 60 %. No obvious wall motion abnormalities identified in   the views obtained. Wall thickness was  normal. DOPPLER: Left ventricular diastolic function parameters were normal   for the patient's age. RIGHT VENTRICLE: The ventricle was moderately dilated. Systolic function was   normal. There was a structure in the RV  apex, could not exclude moderator band. DOPPLER: Systolic pressure was mildly   increased. Estimated peak pressure was 37  mmHg. LEFT ATRIUM: Size was normal. LA volume index was 19 ml/m-sq. RIGHT ATRIUM: The atrium was moderately dilated. MITRAL VALVE: There was annular calcification. Normal valve structure. DOPPLER: There was no evidence for stenosis. There was no significant regurgitation. AORTIC VALVE: The valve was probably trileaflet. Leaflets exhibited good   mobility. DOPPLER: There was no stenosis. There was no significant regurgitation. TRICUSPID VALVE: Normal valve structure. DOPPLER: There was no evidence for   tricuspid stenosis. There was trivial  regurgitation. Right atrial pressure estimate: 8 mmHg. PULMONIC VALVE: Not well visualized. DOPPLER: There was no stenosis.  There   was trivial regurgitation. AORTA: The root exhibited normal size. SYSTEMIC VEINS: IVC: The inferior vena cava was dilated. Respirophasic   changes were normal.    PERICARDIUM: Insignificant pericardial effusion and/or pericardial fat was   present. SYSTEM MEASUREMENT TABLES    2D  Ao Diam: 2.91 cm  IVSd: 0.89 cm  LVIDd: 4.98 cm  LVIDs: 3.12 cm  LVPWd: 0.89 cm  SV(Teich): 78.87 ml  EDV(Teich): 117.23 ml  ESV(Cube): 30.23 ml  ESV(Teich): 38.37 ml  LAAs A2C: 14.87 cm2  LAAs A4C: 16.68 cm2  LAESV A-L A2C: 38.5 ml  LAESV A-L A4C: 45.93 ml  LAESV Index (A-L): 18.61 ml/m2  LAESV MOD A2C: 37.22 ml  LAESV MOD A4C: 41.77 ml  LAESV(A-L): 43.18 ml  LALs A2C: 4.88 cm  LALs A4C: 5.14 cm  LVEDV MOD A4C: 71.82 ml  LVESV MOD A4C: 23.91 ml  LVLd A4C: 8.1 cm  LVLs A4C: 7 cm  LVOT Diam: 1.99 cm  RA Area: 23.1 cm2  RV Major: 5.92 cm  RV Minor: 4.4 cm  SV MOD A4C: 47.91 ml    CW  TR Vmax: 2.71 m/s  IVRT: 82.6 ms  TR maxP.29 mmHG    MM  Tapse: 2.43 cm    PW  LVOT VTI: 16.74 cm  LVOT Vmax: 1.06 m/s  LVOT Vmean: 0.69 m/s  MV A Alex: 0.82 m/s  MV Dec Sunflower: 3.95 m/s2  MV DecT: 185.16 ms  MV E Alex: 0.73 m/s  MV E/A Ratio: 0.89  LVOT Env. Ti: 241.89 ms  LVOT maxP.52 mmHG  LVOT meanP.33 mmHG  LVSI Dopp: 22.4 ml/m2  LVSV Dopp: 51.96 ml    Prepared and E-signed by    Juanpablo Winston MD  Signed 2018 15:45:56       PFTs:  Personally reviewed from today as follows - shows nonspecific ventilatory defect - restrictive neal, normal lung volumes, and normal diffusion    Assessment and Plan:  46 y.o. male with:    Impression:  1. Recurrent VTE - acute DVT and PE on 18 pt s/p IVC filter placement on 18 by IR - Dr. Chrissy Bowen. Prior PE was treated with 1 year of anticoagulation and acutely with tPA about 5 years ago  2. Factor V Leiden deficiency:  Pt seen by Hematology - Dr. Dorothea Ribeiro  3.  Lung nodule:  Seen in [place] along posterior pleural surface - more solid in appearance on CTA from  -- prior it was ground glass -- suspect evolving hematoma/infarction  4. Former smoker:  Quit in 2004  5. Chronic hidradenitis    Plan:  -Repeat CT chest to evaluate solid lung nodule in 3 months -- mass vs evolving hematoma  -Repeat LE duplex to assess clot extent  -Spent a long time in discussion with the patient and his wife regarding the IVC filter. I noted to them, that it should be removed within the first year as he is tolerating anticoagulation and has no contraindications. We discussed his upcoming potential surgery for I&D -- I advised him that he can come off of anticoagulation for the surgery and resume as soon as possible. He does have an increased risk for VTE during that time, however with his IVC filter in place, the risk is lower. I advised him that he if he undergoes surgery in the next 1-2 months, he should proceed before removing the IVC filter. Otherwise, if he is going to postpone surgery for an unknown number of months, he should get the IVC filter removed. -Continue Eliquis BID for lifelong anticoagulation. See above regarding surgery (hold 48h prior to major surgery and resume previous dose when appropriate per surgeon). -Counseled the patient regarding avoiding contact sports and high risk activity that would result in head injury or body injury due to increased risk of ICH or hematoma/lifethreatining bleed  -Will place order for IVC filter removal once surgery date set and duplex is performed      RTC: Follow-up Disposition:  Return in about 3 months (around 7/10/2018).       Orders Placed This Encounter    CT CHEST W CONT    DUPLEX LOWER EXT VENOUS BILAT     40 minutes were spent in this encounter with >50% of this time spent in face to face counseling regarding the above      Leti Guerin MD/MPH     Pulmonary, [address] [place] [address] [facility]

## 2018-04-10 NOTE — LETTER
4/15/2018 3:50 PM 
 
Patient:  Andrzej Padron YOB: 1965 Date of Visit: 4/10/2018 Dear MD Amita [address] [facility] [address] VIA Facsimile: [phone] Deepali Petersen MD 
[address] [facility] [address] VIA Facsimile: [phone] 
 : Thank you for referring Mr. Cory Elaine to me for evaluation/treatment. Below are the relevant portions of my assessment and plan of care. If you have questions, please do not hesitate to call me. I look forward to following Mr. Reaves along with you. Sincerely, Azael Lackey MD/MPH Pulmonary, Critical Care Medicine [facility]

## 2018-04-16 ENCOUNTER — HOSPITAL ENCOUNTER (OUTPATIENT)
Dept: VASCULAR SURGERY | Age: 53
Discharge: HOME | End: 2018-04-16
Attending: INTERNAL MEDICINE
Payer: OTHER GOVERNMENT

## 2018-04-16 DIAGNOSIS — R93.89 ABNORMAL FINDING ON IMAGING: ICD-10-CM

## 2018-04-16 DIAGNOSIS — I26.99 OTHER ACUTE PULMONARY EMBOLISM WITHOUT ACUTE COR PULMONALE (HCC): ICD-10-CM

## 2018-04-16 DIAGNOSIS — Z87.891 FORMER SMOKER: ICD-10-CM

## 2018-04-16 DIAGNOSIS — R91.1 PULMONARY NODULE: ICD-10-CM

## 2018-04-16 DIAGNOSIS — R06.2 WHEEZING: ICD-10-CM

## 2018-04-16 DIAGNOSIS — I82.493 ACUTE DEEP VEIN THROMBOSIS (DVT) OF OTHER SPECIFIED VEIN OF BOTH LOWER EXTREMITIES (HCC): ICD-10-CM

## 2018-04-16 PROCEDURE — 93970 EXTREMITY STUDY: CPT

## 2018-04-16 NOTE — PROCEDURES
[facility]  *** FINAL REPORT ***    Name: Lonny Mtz  MRN: [id number]    Outpatient  : 14 Oct 1965  HIS Order #: [id number]  [address] [facility] Visit #: [id number]  Date: 2018    TYPE OF TEST: Peripheral Venous Testing    REASON FOR TEST  Thrombosis, specified vein    Right Leg:-  Deep venous thrombosis:           No  Superficial venous thrombosis:    No  Deep venous insufficiency:        Not examined  Superficial venous insufficiency: Not examined    Left Leg:-  Deep venous thrombosis:           No  Superficial venous thrombosis:    No  Deep venous insufficiency:        Not examined  Superficial venous insufficiency: Not examined      INTERPRETATION/FINDINGS  Duplex images were obtained using 2-D gray scale, color flow, and  spectral Doppler analysis. Right leg :  1. Deep veins visualized include the common femoral, femoral,  popliteal, posterior tibial and peroneal veins. 2. No evidence of deep venous thrombosis detected in the veins  visualized. 3. Thehe previous posterior tibial and peroneal vein thrombus is not  visualized at this time. 4. Superficial veins visualized include the great saphenous vein. 5. No evidence of superficial thrombosis detected. 6. Normal multiphasic flow seen in the posterior tibial artery. Left leg :  1. Deep veins visualized include the common femoral, femoral,  popliteal, posterior tibial and peroneal veins. 2. No evidence of deep venous thrombosis detected in the veins  visualized. 3. Superficial veins visualized include the great saphenous vein. 4. No evidence of superficial thrombosis detected. 5. Normal multiphasic flow seen in the posterior tibial artery. ADDITIONAL COMMENTS    I have personally reviewed the data relevant to the interpretation of  this  study. TECHNOLOGIST: NOREEN Jesus, RVS  Signed: 2018 04:22 PM    PHYSICIAN: Claudine Arreguin D.O.   Signed: 2018 08:38 AM

## 2018-04-20 ENCOUNTER — TELEPHONE (OUTPATIENT)
Dept: PULMONOLOGY | Age: 53
End: 2018-04-20

## 2018-04-20 NOTE — TELEPHONE ENCOUNTER
Pt would like to know his results of his dvts that he had done at . He would also like to know if he is cleared for his surgery that is scheduled for 5/15. Can Dr. Danyelle Mccloud do a note clearing him to send to his dr?  Please call pt at [phone].

## 2018-04-26 NOTE — TELEPHONE ENCOUNTER
The pt. Is contacted re: Doppler Study being negative. The Office note 4/10/18 is faxed to the surgeon:   Dr. Carol Cotton Ph: [phone], F: [phone].

## 2018-04-26 NOTE — TELEPHONE ENCOUNTER
Find out who the surgeon is and send them my last clinic note.  I sent a message to Ni Bernal to let the patient know his doppler was negative, so you can let him know that.  If the surgeon needs anything else, please let me know.     Matthew Ríos

## 2018-04-26 NOTE — TELEPHONE ENCOUNTER
A note re: the pt. Wishes is pended to Dr. Addi Barlow. He could possibly address this in office Friday, 4/27.

## 2018-05-03 ENCOUNTER — TELEPHONE (OUTPATIENT)
Dept: PULMONOLOGY | Age: 53
End: 2018-05-03

## 2018-05-03 NOTE — TELEPHONE ENCOUNTER
Pt calling because he has questions about an ivc filter? He said that Dr. Ana Luisa Sheehan originally wanted him to keep it in until after he had surgery but that surgery has been pushed back to July so he wants to know if he should still wait to have it removed or if it should be done now. He also would like to know how he goes about getting it set up to be removed and who actually removes it.  Please call

## 2018-05-03 NOTE — TELEPHONE ENCOUNTER
Spoke with pt after reviewing office note with Dr. Maurice Mcginnis. Advised the information below from the vist note:  (Spent a long time in discussion with the patient and his wife regarding the IVC filter. I noted to them, that it should be removed within the first year as he is tolerating anticoagulation and has no contraindications. We discussed his upcoming potential surgery for I&D -- I advised him that he can come off of anticoagulation for the surgery and resume as soon as possible. He does have an increased risk for VTE during that time, however with his IVC filter in place, the risk is lower. I advised him that he if he undergoes surgery in the next 1-2 months, he should proceed before removing the IVC filter. Otherwise, if he is going to postpone surgery for an unknown number of months, he should get the IVC filter removed.)  With this information and having surgery in July pt to keep filter for now.  If surgery is postponed he will call with new update and recommendation

## 2018-05-18 DIAGNOSIS — I82.409 DEEP VEIN THROMBOSIS (DVT) (HCC): ICD-10-CM

## 2018-06-13 ENCOUNTER — TELEPHONE (OUTPATIENT)
Dept: PULMONOLOGY | Age: 53
End: 2018-06-13

## 2018-06-13 NOTE — TELEPHONE ENCOUNTER
Pt is supposed to be having fu ct in July and he would like to know if Dr. Luis Miguel Hernandez wanted to have his IVC filter checked during the scan. Please call .  He has not scheduled the CT scan yet

## 2018-06-15 NOTE — TELEPHONE ENCOUNTER
MD Rody Arnold LPN        Caller: Unspecified (2 days ago, 11:31 AM)                    I don't know what he is mentioning.  He needs the CT chest in July -- that is for a lung nodule, not the filter.  He needs to tell us when his surgery is. Diya Lily he has not scheduled it, then we will refer him to get his IVC filter removed.  His leg duplex was negative so the filter can be removed at any point. Ruth Welch MD/MPH   Pulmonary, [address] [place] [address] [facility]      Called patient, no answer, left message requesting return call. Patient returned my call, I reviewed with him what Dr. Arvin Denver wanted him to do. Patient verbalized understanding.

## 2018-07-24 ENCOUNTER — HOSPITAL ENCOUNTER (OUTPATIENT)
Dept: CT IMAGING | Age: 53
Discharge: HOME | End: 2018-07-24
Attending: INTERNAL MEDICINE
Payer: OTHER GOVERNMENT

## 2018-07-24 DIAGNOSIS — I26.99 OTHER ACUTE PULMONARY EMBOLISM WITHOUT ACUTE COR PULMONALE (HCC): ICD-10-CM

## 2018-07-24 DIAGNOSIS — R91.1 PULMONARY NODULE: ICD-10-CM

## 2018-07-24 DIAGNOSIS — Z87.891 FORMER SMOKER: ICD-10-CM

## 2018-07-24 DIAGNOSIS — I82.493 ACUTE DEEP VEIN THROMBOSIS (DVT) OF OTHER SPECIFIED VEIN OF BOTH LOWER EXTREMITIES (HCC): ICD-10-CM

## 2018-07-24 DIAGNOSIS — R06.2 WHEEZING: ICD-10-CM

## 2018-07-24 DIAGNOSIS — R93.89 ABNORMAL FINDING ON IMAGING: ICD-10-CM

## 2018-07-24 PROCEDURE — 82565 ASSAY OF CREATININE: CPT

## 2018-07-24 PROCEDURE — 71260 CT THORAX DX C+: CPT

## 2018-07-24 PROCEDURE — 74011636320 HC RX REV CODE- 636/320: Performed by: INTERNAL MEDICINE

## 2018-07-24 RX ADMIN — IOPAMIDOL 75 ML: 612 INJECTION, SOLUTION INTRAVENOUS at 09:32

## 2018-07-26 LAB — CREAT UR-MCNC: 0.8 MG/DL (ref 0.6–1.3)

## 2018-08-22 ENCOUNTER — OFFICE VISIT (OUTPATIENT)
Dept: PULMONOLOGY | Age: 53
End: 2018-08-22

## 2018-08-22 VITALS
WEIGHT: 239 LBS | HEIGHT: 73 IN | TEMPERATURE: 97.5 F | SYSTOLIC BLOOD PRESSURE: 134 MMHG | OXYGEN SATURATION: 98 % | HEART RATE: 98 BPM | DIASTOLIC BLOOD PRESSURE: 92 MMHG | BODY MASS INDEX: 31.68 KG/M2 | RESPIRATION RATE: 18 BRPM

## 2018-08-22 DIAGNOSIS — R91.1 LUNG NODULE: ICD-10-CM

## 2018-08-22 DIAGNOSIS — R94.30 ELEVATED RIGHT VENTRICULAR END-DIASTOLIC PRESSURE: ICD-10-CM

## 2018-08-22 DIAGNOSIS — D68.51 FACTOR V LEIDEN (HCC): ICD-10-CM

## 2018-08-22 DIAGNOSIS — I26.92 SADDLE EMBOLUS OF PULMONARY ARTERY WITHOUT ACUTE COR PULMONALE, UNSPECIFIED CHRONICITY (HCC): Primary | ICD-10-CM

## 2018-08-22 NOTE — PROGRESS NOTES
Chief Complaint   Patient presents with    Lung Nodule     follow up from 4/10/18, PFT 5/10/2018, CT 7/24/2018     1. Have you been to the ER, urgent care clinic since your last visit? Hospitalized since your last visit? Yes Where: [facility]-July 2018    2. Have you seen or consulted any other health care providers outside of the [facility] since your last visit? Include any pap smears or colon screening.  Yes Where: Dr. Denzel Macdonald, Plastic surgeon

## 2018-08-22 NOTE — PROGRESS NOTES
[address] [place] [address] [facility]  Pulmonary, Critical Care, and Sleep Medicine    Pulmonary Office Followup  Name: Libby Reaves 46 y.o. male  MRN: [id number]  : 1965  Service Date: 18  Chief Complaint:   Chief Complaint   Patient presents with    Lung Nodule     follow up from 4/10/18, PFT 5/10/2018, CT 2018       History of Present Illness:  Isidoro Salgado is a 46 y.o. male, who presents to Pulmonary clinic for followup of VTE/PE. Pt last seen on 4/15/18. In the interval, pt underwent I&D of supprative hidradenitis. Pt reports having some issues with wound closure, but it since has closed. Pt restarted Eliquis a few days afterwards. Pt has since followed up with Dr. Shanae Cardona and started taking oral iron. Pt reports no respiratory complaints in the interval, denies SOB with exertion, cough, hemoptysis, chest pain/angina, N/V/D, orthopnea. Does have chronic LLE swelling after a remote knee surgery    Past Medical Hx: I have personally reviewed medical hx  Past Medical History:   Diagnosis Date    Hidradenitis 2018    Pulmonary embolism ([address]     Rash 2017       Past Surgical Hx: I have personally reviewed surgical hx  History reviewed. No pertinent surgical history. Family Hx: I have personally reviewed the family hx  Family History   Problem Relation Age of Onset    No Known Problems Mother     Hypertension Father     Cancer Father     Stroke Father        Social Hx: I have personally reviewed the social hx. Social History     Social History    Marital status:      Spouse name: N/A    Number of children: N/A    Years of education: N/A     Occupational History    Not on file.      Social History Main Topics    Smoking status: Former Smoker     Packs/day: 1.00     Years: 30.00     Types: Cigarettes     Quit date: 2004    Smokeless tobacco: Never Used    Alcohol use Not on file    Drug use: Not on file    Sexual activity: Not on file     Other Topics Concern    Not on file     Social History Narrative       Allergies: I have reviewed the allergy hx  No Known Allergies    Medications:  I have reviewed the patient's medications  Prior to Admission medications    Medication Sig Start Date End Date Taking? Authorizing Provider   diclofenac (VOLTAREN) 1 % gel Apply  to affected area four (4) times daily. Yes Historical Provider   amLODIPine 10 mg tab 10 mg, valsartan 320 mg tab 320 mg Take  by mouth daily. Yes Historical Provider   apixaban (ELIQUIS) 5 mg tablet Start this medication on 1/30/AFTER Elequis 10 mg dose has been completed  Indications: pulmonary thromboembolism 1/24/18  Yes Judi Yates MD   clindamycin (CLEOCIN) 300 mg capsule Take 300 mg by mouth three (3) times daily. Historical Provider   predniSONE (DELTASONE) 5 mg tablet Take  by mouth. Historical Provider       Immunizations:  I have reviewed the patient's immunizations    There is no immunization history on file for this patient. Review of Systems:  A complete review of systems was performed as stated in the HPI, all others are negative.       Objective:    Physical Exam:  BP (!) 134/92 (BP 1 Location: Left arm, BP Patient Position: Sitting)  Pulse 98  Temp 97.5 °F (36.4 °C) (Oral)   Resp 18  Ht 6' 1\" (1.854 m)  Wt 108.4 kg (239 lb)  SpO2 98%  BMI 31.53 kg/m2  Vitals were personally reviewed  Gen: no acute distress, pleasant and cooperative, sitting up in chair, able to climb to exam table w/o difficulty  HEENT: normocephalic/atraumatic, PERRLA, EOMI, no scleral icterus  Neck: supple, trachea midline, no JVD  CVS: regular rate rhythm, S1/S2, no murmurs/rubs/gallops  Lungs: good air entry B/L, no wheezes/rales/rhonchi  Ext: no pitting edema B/L, L>R, no peripheral cyanosis or clubbing  Neuro: grossly normal, AAOx3, normal strength and coordination grossly, no focal deficits  Psych: normal memory, thought content, and processing    Labs: I have reviewed the patient's available labs  Lab Results   Component Value Date/Time    WBC 10.3 01/21/2018 02:07 PM    HGB 14.0 01/21/2018 02:07 PM    HCT 41.8 01/21/2018 02:07 PM    PLATELET 453 48/58/8224 02:07 PM    MCV 80.4 01/21/2018 02:07 PM     Lab Results   Component Value Date/Time    Sodium 140 01/24/2018 02:23 AM    Potassium 3.4 (L) 01/24/2018 02:23 AM    Chloride 104 01/24/2018 02:23 AM    CO2 30 01/24/2018 02:23 AM    Anion gap 6 01/24/2018 02:23 AM    Glucose 83 01/24/2018 02:23 AM    BUN 6 (L) 01/24/2018 02:23 AM    Creatinine 0.79 01/24/2018 02:23 AM    BUN/Creatinine ratio 8 (L) 01/24/2018 02:23 AM    GFR est AA >60 01/24/2018 02:23 AM    GFR est non-AA >60 01/24/2018 02:23 AM    Calcium 8.6 01/24/2018 02:23 AM       Imaging:  I have personally reviewed patient's imaging -- CT chest from 7/24 shows an RML consolidation, [place] lesion improved, otherwise no emphysema or air trapping. Official read per radiology  CT Results (most recent):    Results from [place] encounter on 07/24/18   CT CHEST W CONT   Narrative EXAM: CT scan of the thorax with intravenous contrast.    CLINICAL HISTORY/INDICATION: Follow-up for recent pulmonary emboli. COMPARISON: CTA chest April 2, 2018. TECHNIQUE: All CT scans at this facility are performed using dose optimization  technique as appropriate to a performed exam, to include automated exposure  control, adjustment of the mA and/or kV according to patient's size (including  appropriate matching for site-specific examinations), or use of iterative  reconstruction technique. Jennifer Sharma FINDINGS:    CT scan is performed with intravenous contrast material. There is normal  opacification of the vascular structures of the mediastinum. The aorta is normal  in caliber. No evidence for intraluminal filling defect or obstruction to  pulmonary arteries are seen.  The previously reported lymph node adjacent to the  aortic arch appears slightly smaller on the current exam. No evidence for  mediastinal or hilar adenopathy is present. No evidence for pericardial effusion  is seen. The partially imaged upper abdominal organs are unremarkable. The small  lymph node adjacent to the esophagogastric junction is not present on the  current exam.  The previously reported pleural-based nodule in the left lower lung field is  smaller on the current exam than on the previous study. Its longest measurement  is 7 mm. In the right middle lobe where there was groundglass opacities there  now appears to be a more confluent contiguous density pleural-based. The lesion  measures 2.9 x 0.9 cm. Further evaluation and follow-up of this area is  warranted. No pleural effusion or pneumothorax is seen. .         Impression IMPRESSION:    No evidence for pulmonary emboli. Decreased size of the mediastinal lymph node adjacent to the aorta. Decreased size of the pleural-based nodule in the left lung. Consolidation in the area of the previously noted right middle lobe groundglass  area that now measures 2.9 x 0.9 cm and is pleural-based along the right lateral  chest wall. Image 39 of series number 4. The osseous structures show degenerative changes in the thoracic spine but no  acute fracture or destructive bony lesion.     LE duplex from may is negative    TTE:  I have reviewed the patient's TTE results  Results for orders placed or performed during the hospital encounter of 18   2D ECHO COMPLETE ADULT (TTE) W OR WO CONTR     Status: None    [facility], [address]  [phone]    Transthoracic Echocardiogram    Patient: [facility] Floor  MRN: [id number]  ACCT #: [de-identified]  : 1965  Age: 46 years  Gender: Male  Height: 73 in  Weight: 237.6 lb  BSA: 2.32 m-sq  BP: 151 / 99 mmHg  Study date: 2018  Status: Routine  Location: [facility] DMS ACC #: [id number]    Allergies: NO KNOWN ALLERGIES    Referring_Ordering Physician:  [facility] Hospitalist  Interpreting Group:  5 [facility]  Interpreting Physician:  Juanpablo Hunt MD  Technologist:  Henry Ortega [facility], [facility]    IMPRESSIONS:  A clinically significant myopathic process is ruled out. The findings suggest   the possibility of pulmonary embolism. Features were consistent with pulmonary hypertension. SUMMARY:  Left ventricle: Systolic function was normal by visual assessment. Ejection   fraction was estimated in the range of 55 %  to 60 %. No obvious wall motion abnormalities identified in the views   obtained. Right ventricle: The ventricle was moderately dilated. Systolic pressure was   mildly increased. Estimated peak pressure  was 37 mmHg. There was a structure in the RV apex, could not exclude   moderator band. Right atrium: The atrium was moderately dilated. Inferior vena cava, hepatic veins: The inferior vena cava was dilated. INDICATIONS: Chest pain, PE.    HISTORY: Prior history: Pulmonary embolism. PROCEDURE: This was a routine study. The study included complete 2D imaging,   M-mode, complete spectral Doppler, and  color Doppler. The heart rate was 74 bpm, at the start of the study. Systolic   blood pressure was 151 mmHg, at the start  of the study. Diastolic blood pressure was 99 mmHg, at the start of the   study. Images were obtained from the  parasternal, apical, subcostal, and suprasternal notch acoustic windows. Echocardiographic views were limited by poor  acoustic window availability. This was a technically difficult study. LEFT VENTRICLE: Size was normal. Systolic function was normal by visual   assessment. Ejection fraction was estimated in  the range of 55 % to 60 %. No obvious wall motion abnormalities identified in   the views obtained. Wall thickness was  normal. DOPPLER: Left ventricular diastolic function parameters were normal   for the patient's age. RIGHT VENTRICLE: The ventricle was moderately dilated.  Systolic function was   normal. There was a structure in the RV  apex, could not exclude moderator band. DOPPLER: Systolic pressure was mildly   increased. Estimated peak pressure was 37  mmHg. LEFT ATRIUM: Size was normal. LA volume index was 19 ml/m-sq. RIGHT ATRIUM: The atrium was moderately dilated. MITRAL VALVE: There was annular calcification. Normal valve structure. DOPPLER: There was no evidence for stenosis. There was no significant regurgitation. AORTIC VALVE: The valve was probably trileaflet. Leaflets exhibited good   mobility. DOPPLER: There was no stenosis. There was no significant regurgitation. TRICUSPID VALVE: Normal valve structure. DOPPLER: There was no evidence for   tricuspid stenosis. There was trivial  regurgitation. Right atrial pressure estimate: 8 mmHg. PULMONIC VALVE: Not well visualized. DOPPLER: There was no stenosis. There   was trivial regurgitation. AORTA: The root exhibited normal size. SYSTEMIC VEINS: IVC: The inferior vena cava was dilated. Respirophasic   changes were normal.    PERICARDIUM: Insignificant pericardial effusion and/or pericardial fat was   present.     SYSTEM MEASUREMENT TABLES    2D  Ao Diam: 2.91 cm  IVSd: 0.89 cm  LVIDd: 4.98 cm  LVIDs: 3.12 cm  LVPWd: 0.89 cm  SV(Teich): 78.87 ml  EDV(Teich): 117.23 ml  ESV(Cube): 30.23 ml  ESV(Teich): 38.37 ml  LAAs A2C: 14.87 cm2  LAAs A4C: 16.68 cm2  LAESV A-L A2C: 38.5 ml  LAESV A-L A4C: 45.93 ml  LAESV Index (A-L): 18.61 ml/m2  LAESV MOD A2C: 37.22 ml  LAESV MOD A4C: 41.77 ml  LAESV(A-L): 43.18 ml  LALs A2C: 4.88 cm  LALs A4C: 5.14 cm  LVEDV MOD A4C: 71.82 ml  LVESV MOD A4C: 23.91 ml  LVLd A4C: 8.1 cm  LVLs A4C: 7 cm  LVOT Diam: 1.99 cm  RA Area: 23.1 cm2  RV Major: 5.92 cm  RV Minor: 4.4 cm  SV MOD A4C: 47.91 ml    CW  TR Vmax: 2.71 m/s  IVRT: 82.6 ms  TR maxP.29 mmHG    MM  Tapse: 2.43 cm    PW  LVOT VTI: 16.74 cm  LVOT Vmax: 1.06 m/s  LVOT Vmean: 0.69 m/s  MV A Alex: 0.82 m/s  MV Dec [place]: 3.95 m/s2  MV DecT: 185.16 ms  MV E Alex: 0.73 m/s  MV E/A Ratio: 0.89  LVOT Env. Ti: 241.89 ms  LVOT maxP.52 mmHG  LVOT meanP.33 mmHG  LVSI Dopp: 22.4 ml/m2  LVSV Dopp: 51.96 ml    Prepared and E-signed by    Juanpablo Maynard MD  Signed 2018 15:45:56       PFTs:  Personally reviewed from April as follows - shows nonspecific ventilatory defect - restrictive neal, normal lung volumes, and normal diffusion    Assessment and Plan:  46 y.o. male with:    Impression:  1. Recurrent VTE - acute DVT and PE on 18 pt s/p IVC filter placement on 18 by IR - Dr. Annita Ulloa. Prior PE was treated with 1 year of anticoagulation and acutely with tPA about 5 years ago. Repeat LE duplex is negative for DVT  2. Factor V Leiden deficiency:  Pt seen by Hematology - Dr. Charlie Pulliam  3. Lung nodules:  Seen in [place] along posterior pleural surface as well as RML consolidation. suspect evolving hematoma/infarction  4. RV strain:  Seen on TTE in January - RVSP 37mmHg  4. Former smoker:  Quit in   5. Chronic hidradenitis    Plan:  -Repeat CT chest in 6 months  -Repeat TTE before next visit  -Reduce eliquis to ppx dose at 2.5mg BID, indefinitely  -Order placed to IR to remove IVC filter as patient is tolerating anticoagulation without issue.  -Counseled the patient regarding avoiding contact sports and high risk activity that would result in head injury or body injury due to increased risk of ICH or hematoma/lifethreatining bleed  -Advised to remain active      RTC: Follow-up Disposition:  Return in about 6 months (around 2019). 25 minutes were spent in this encounter with >50% of this time spent in face to face counseling regarding the above and coordination of care - reviewed the indication to stepdown dosing of eliquis. Wife had multiple questions regarding IVC filter including placement, removal, etc.  We reviewed indication for repeat CT scan.   We also reviewed lifestyle modifications while on anticoagulation to avoid injury.       Angela Nelson MD/MPH     Pulmonary, Critical Care Medicine  [facility]

## 2018-08-22 NOTE — MR AVS SNAPSHOT
[address] [facility] [address], [address] [place] [address] 
[phone] Patient: Rosemarie Fraga MRN: [id number] :1965 Visit Information Date & Time Provider Department Dept. Phone Encounter #  
 2018  8:30 AM Bridgett Essex, MD [facility] Hiro Cook [id number] Follow-up Instructions Return in about 6 months (around 2019). Upcoming Health Maintenance Date Due Hepatitis C Screening 1965 DTaP/Tdap/Td series (1 - Tdap) 10/14/1986 FOBT Q 1 YEAR AGE 50-75 10/14/2015 Influenza Age 5 to Adult 2018 Allergies as of 2018  Review Complete On: 2018 By: Glen Valencia LPN No Known Allergies Current Immunizations  Never Reviewed No immunizations on file. Not reviewed this visit You Were Diagnosed With   
  
 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 
ICD-9-CM: 415.13 Factor V [facility])     ICD-10-CM: H04.98 
ICD-9-CM: 289.81 Lung nodule     ICD-10-CM: R91.1 ICD-9-CM: 793.11 Elevated right ventricular end-diastolic pressure     HGY-73-VB: R94.30 ICD-9-CM: 794.30 Vitals BP Pulse Temp Resp Height(growth percentile) Weight(growth percentile) (!) 134/92 (BP 1 Location: Left arm, BP Patient Position: Sitting) 98 97.5 °F (36.4 °C) (Oral) 18 6' 1\" (1.854 m) 239 lb (108.4 kg) SpO2 BMI Smoking Status 98% 31.53 kg/m2 Former Smoker Vitals History BMI and BSA Data Body Mass Index Body Surface Area  
 31.53 kg/m 2 2.36 m 2 Preferred Pharmacy Pharmacy Name Phone [facility] [phone] Your Updated Medication List  
  
   
This list is accurate as of 18  9:09 AM.  Always use your most recent med list.  
  
  
  
  
 amLODIPine 10 mg tab 10 mg, valsartan 320 mg tab 320 mg Take  by mouth daily. apixaban 2.5 mg tablet Commonly known as:  Wonda Deem Take 1 Tab by mouth two (2) times a day. clindamycin 300 mg capsule Commonly known as:  CLEOCIN Take 300 mg by mouth three (3) times daily. diclofenac 1 % Gel Commonly known as:  VOLTAREN Apply  to affected area four (4) times daily. predniSONE 5 mg tablet Commonly known as:  Sue Roers Take  by mouth. Prescriptions Sent to Pharmacy Refills  
 apixaban (ELIQUIS) 2.5 mg tablet 3 Sig: Take 1 Tab by mouth two (2) times a day. Class: Normal  
 Pharmacy: [address] [place] [address], [address] [place] [address] #: [phone] Route: Oral  
  
Follow-up Instructions Return in about 6 months (around 2/22/2019). To-Do List   
 08/22/2018 Imaging:  IR REMOVE IVC FILTER W SI   
  
 01/01/2019 Echocardiography:  ECHO ADULT COMPLETE   
  
 02/22/2019 Imaging:  CT CHEST W CONT Introducing [facility]! Dear Rocky Gleason: 
Thank you for requesting a [employer] account. Our records indicate that you already have an active [employer] account. You can access your account anytime at [email or web address]. [employer] Did you know that you can access your hospital and ER discharge instructions at any time in [employer]? You can also review all of your test results from your hospital stay or ER visit. Additional Information If you have questions, please visit the Frequently Asked Questions section of the [employer] website at [email or web address] [email or web address] Remember, [employer] is NOT to be used for urgent needs. For medical emergencies, dial 911. Now available from your iPhone and Android! Please provide this summary of care documentation to your next provider. Your primary care clinician is listed as NONE.  If you have any questions after today's visit, please call [phone].

## 2018-08-22 NOTE — LETTER
8/22/2018 9:22 AM 
 
Patient:  So Labor YOB: 1965 Date of Visit: 8/22/2018 Dear Jermaine Cody MD 
[address] [place] [address] [address] [id number] [place] [address] VIA Facsimile: [phone] 
 : Thank you for referring Mr. Reinaldo Feng to me for evaluation/treatment. Below are the relevant portions of my assessment and plan of care. If you have questions, please do not hesitate to call me. I look forward to following Mr. Reaves along with you. Sincerely, Belkis Goldman MD/MPH Pulmonary, Critical Care Medicine [facility]

## 2018-09-06 ENCOUNTER — TELEPHONE (OUTPATIENT)
Dept: PULMONOLOGY | Age: 53
End: 2018-09-06

## 2018-09-06 NOTE — TELEPHONE ENCOUNTER
PT CALLED([phone]). MELANI VAZQUEZEL WAS TO SCHEDULE REMOVAL OF IV FILTER. SCHEDULING CALLED BUT TOLD PT THAT RADIOLOGY DEPT WOULD BE CALLING HIM TO SCHEDULE REMOVAL  HE HAS NOT HEARD FROM THEM AND WANTS TO KNOW IF THERE IS A NUMBER HE CAN CALL. PLEASE CALL HIM BACK.

## 2018-09-21 ENCOUNTER — HOSPITAL ENCOUNTER (OUTPATIENT)
Dept: VASCULAR SURGERY | Age: 53
Discharge: HOME | End: 2018-09-21
Attending: RADIOLOGY
Payer: OTHER GOVERNMENT

## 2018-09-21 DIAGNOSIS — I82.409 ACUTE EMBOLISM AND THROMBOSIS OF UNSPECIFIED DEEP VEINS OF UNSPECIFIED LOWER EXTREMITY (HCC): ICD-10-CM

## 2018-09-21 PROCEDURE — 93970 EXTREMITY STUDY: CPT

## 2018-11-09 ENCOUNTER — ANESTHESIA (OUTPATIENT)
Dept: CARDIAC CATH/INVASIVE PROCEDURES | Age: 53
End: 2018-11-09
Payer: OTHER GOVERNMENT

## 2018-11-09 ENCOUNTER — ANESTHESIA EVENT (OUTPATIENT)
Dept: CARDIAC CATH/INVASIVE PROCEDURES | Age: 53
End: 2018-11-09
Payer: OTHER GOVERNMENT

## 2018-11-09 ENCOUNTER — HOSPITAL ENCOUNTER (OUTPATIENT)
Dept: INTERVENTIONAL RADIOLOGY/VASCULAR | Age: 53
Discharge: HOME | End: 2018-11-09
Attending: RADIOLOGY | Admitting: RADIOLOGY
Payer: OTHER GOVERNMENT

## 2018-11-09 VITALS
RESPIRATION RATE: 14 BRPM | SYSTOLIC BLOOD PRESSURE: 132 MMHG | HEART RATE: 92 BPM | OXYGEN SATURATION: 99 % | DIASTOLIC BLOOD PRESSURE: 90 MMHG

## 2018-11-09 VITALS
TEMPERATURE: 97.8 F | HEART RATE: 97 BPM | DIASTOLIC BLOOD PRESSURE: 92 MMHG | OXYGEN SATURATION: 96 % | RESPIRATION RATE: 18 BRPM | SYSTOLIC BLOOD PRESSURE: 161 MMHG

## 2018-11-09 DIAGNOSIS — I26.92 SADDLE EMBOLUS OF PULMONARY ARTERY WITHOUT ACUTE COR PULMONALE, UNSPECIFIED CHRONICITY (HCC): ICD-10-CM

## 2018-11-09 DIAGNOSIS — I82.409 DVT (DEEP VENOUS THROMBOSIS) (HCC): ICD-10-CM

## 2018-11-09 DIAGNOSIS — D68.51 FACTOR V LEIDEN (HCC): ICD-10-CM

## 2018-11-09 LAB
ANION GAP SERPL CALC-SCNC: 7 MMOL/L (ref 3–18)
APTT PPP: 25.4 SEC (ref 23–36.4)
BUN SERPL-MCNC: 10 MG/DL (ref 7–18)
BUN/CREAT SERPL: 12 (ref 12–20)
CALCIUM SERPL-MCNC: 9.1 MG/DL (ref 8.5–10.1)
CHLORIDE SERPL-SCNC: 104 MMOL/L (ref 100–108)
CO2 SERPL-SCNC: 30 MMOL/L (ref 21–32)
CREAT SERPL-MCNC: 0.85 MG/DL (ref 0.6–1.3)
ERYTHROCYTE [DISTWIDTH] IN BLOOD BY AUTOMATED COUNT: 13.3 % (ref 11.6–14.5)
GLUCOSE SERPL-MCNC: 90 MG/DL (ref 74–99)
HCT VFR BLD AUTO: 43.9 % (ref 36–48)
HGB BLD-MCNC: 14.9 G/DL (ref 13–16)
INR PPP: 0.9 (ref 0.8–1.2)
MCH RBC QN AUTO: 29.8 PG (ref 24–34)
MCHC RBC AUTO-ENTMCNC: 33.9 G/DL (ref 31–37)
MCV RBC AUTO: 87.8 FL (ref 74–97)
PLATELET # BLD AUTO: 225 K/UL (ref 135–420)
PMV BLD AUTO: 10.4 FL (ref 9.2–11.8)
POTASSIUM SERPL-SCNC: 3.5 MMOL/L (ref 3.5–5.5)
PROTHROMBIN TIME: 11.9 SEC (ref 11.5–15.2)
RBC # BLD AUTO: 5 M/UL (ref 4.7–5.5)
SODIUM SERPL-SCNC: 141 MMOL/L (ref 136–145)
WBC # BLD AUTO: 7.5 K/UL (ref 4.6–13.2)

## 2018-11-09 PROCEDURE — 80048 BASIC METABOLIC PNL TOTAL CA: CPT

## 2018-11-09 PROCEDURE — 85610 PROTHROMBIN TIME: CPT

## 2018-11-09 PROCEDURE — 74011636320 HC RX REV CODE- 636/320: Performed by: RADIOLOGY

## 2018-11-09 PROCEDURE — 74011250636 HC RX REV CODE- 250/636: Performed by: RADIOLOGY

## 2018-11-09 PROCEDURE — 74011250636 HC RX REV CODE- 250/636

## 2018-11-09 PROCEDURE — 85027 COMPLETE CBC AUTOMATED: CPT

## 2018-11-09 PROCEDURE — 77030008584 HC TOOL GDWRE DEV TERU -A

## 2018-11-09 PROCEDURE — C1769 GUIDE WIRE: HCPCS

## 2018-11-09 PROCEDURE — C1887 CATHETER, GUIDING: HCPCS

## 2018-11-09 PROCEDURE — 76060000032 HC ANESTHESIA 0.5 TO 1 HR

## 2018-11-09 PROCEDURE — 37193 REM ENDOVAS VENA CAVA FILTER: CPT

## 2018-11-09 PROCEDURE — 74011000250 HC RX REV CODE- 250

## 2018-11-09 PROCEDURE — 76210000026 HC REC RM PH II 1 TO 1.5 HR

## 2018-11-09 PROCEDURE — 85730 THROMBOPLASTIN TIME PARTIAL: CPT

## 2018-11-09 PROCEDURE — C1893 INTRO/SHEATH, FIXED,NON-PEEL: HCPCS

## 2018-11-09 PROCEDURE — C1773 RET DEV, INSERTABLE: HCPCS

## 2018-11-09 RX ORDER — ONDANSETRON 2 MG/ML
INJECTION INTRAMUSCULAR; INTRAVENOUS AS NEEDED
Status: DISCONTINUED | OUTPATIENT
Start: 2018-11-09 | End: 2018-11-09 | Stop reason: HOSPADM

## 2018-11-09 RX ORDER — PROPOFOL 10 MG/ML
INJECTION, EMULSION INTRAVENOUS AS NEEDED
Status: DISCONTINUED | OUTPATIENT
Start: 2018-11-09 | End: 2018-11-09 | Stop reason: HOSPADM

## 2018-11-09 RX ORDER — LIDOCAINE HYDROCHLORIDE 10 MG/ML
30 INJECTION, SOLUTION EPIDURAL; INFILTRATION; INTRACAUDAL; PERINEURAL ONCE
Status: COMPLETED | OUTPATIENT
Start: 2018-11-09 | End: 2018-11-09

## 2018-11-09 RX ORDER — ONDANSETRON 2 MG/ML
4 INJECTION INTRAMUSCULAR; INTRAVENOUS ONCE
Status: ACTIVE | OUTPATIENT
Start: 2018-11-09 | End: 2018-11-10

## 2018-11-09 RX ORDER — GLYCOPYRROLATE 0.2 MG/ML
INJECTION INTRAMUSCULAR; INTRAVENOUS AS NEEDED
Status: DISCONTINUED | OUTPATIENT
Start: 2018-11-09 | End: 2018-11-09 | Stop reason: HOSPADM

## 2018-11-09 RX ORDER — LOSARTAN POTASSIUM AND HYDROCHLOROTHIAZIDE 12.5; 1 MG/1; MG/1
1 TABLET ORAL DAILY
COMMUNITY

## 2018-11-09 RX ORDER — DEXAMETHASONE SODIUM PHOSPHATE 4 MG/ML
INJECTION, SOLUTION INTRA-ARTICULAR; INTRALESIONAL; INTRAMUSCULAR; INTRAVENOUS; SOFT TISSUE AS NEEDED
Status: DISCONTINUED | OUTPATIENT
Start: 2018-11-09 | End: 2018-11-09 | Stop reason: HOSPADM

## 2018-11-09 RX ORDER — MIDAZOLAM HYDROCHLORIDE 1 MG/ML
INJECTION, SOLUTION INTRAMUSCULAR; INTRAVENOUS AS NEEDED
Status: DISCONTINUED | OUTPATIENT
Start: 2018-11-09 | End: 2018-11-09 | Stop reason: HOSPADM

## 2018-11-09 RX ORDER — IODIXANOL 320 MG/ML
200 INJECTION, SOLUTION INTRAVASCULAR
Status: COMPLETED | OUTPATIENT
Start: 2018-11-09 | End: 2018-11-09

## 2018-11-09 RX ORDER — LIDOCAINE HYDROCHLORIDE 20 MG/ML
INJECTION, SOLUTION EPIDURAL; INFILTRATION; INTRACAUDAL; PERINEURAL AS NEEDED
Status: DISCONTINUED | OUTPATIENT
Start: 2018-11-09 | End: 2018-11-09 | Stop reason: HOSPADM

## 2018-11-09 RX ORDER — CEFAZOLIN SODIUM 2 G/50ML
2 SOLUTION INTRAVENOUS ONCE
Status: COMPLETED | OUTPATIENT
Start: 2018-11-09 | End: 2018-11-09

## 2018-11-09 RX ORDER — FENTANYL CITRATE 50 UG/ML
50 INJECTION, SOLUTION INTRAMUSCULAR; INTRAVENOUS AS NEEDED
Status: DISCONTINUED | OUTPATIENT
Start: 2018-11-09 | End: 2018-11-23 | Stop reason: HOSPADM

## 2018-11-09 RX ORDER — FENTANYL CITRATE 50 UG/ML
INJECTION, SOLUTION INTRAMUSCULAR; INTRAVENOUS AS NEEDED
Status: DISCONTINUED | OUTPATIENT
Start: 2018-11-09 | End: 2018-11-09 | Stop reason: HOSPADM

## 2018-11-09 RX ORDER — SODIUM CHLORIDE 0.9 % (FLUSH) 0.9 %
5-10 SYRINGE (ML) INJECTION AS NEEDED
Status: DISCONTINUED | OUTPATIENT
Start: 2018-11-09 | End: 2018-11-23 | Stop reason: HOSPADM

## 2018-11-09 RX ORDER — SODIUM CHLORIDE 9 MG/ML
20 INJECTION, SOLUTION INTRAVENOUS CONTINUOUS
Status: DISCONTINUED | OUTPATIENT
Start: 2018-11-09 | End: 2018-11-23 | Stop reason: HOSPADM

## 2018-11-09 RX ADMIN — MIDAZOLAM HYDROCHLORIDE 2 MG: 1 INJECTION, SOLUTION INTRAMUSCULAR; INTRAVENOUS at 12:37

## 2018-11-09 RX ADMIN — PROPOFOL 50 MG: 10 INJECTION, EMULSION INTRAVENOUS at 12:49

## 2018-11-09 RX ADMIN — GLYCOPYRROLATE 0.4 MG: 0.2 INJECTION INTRAMUSCULAR; INTRAVENOUS at 12:43

## 2018-11-09 RX ADMIN — PROPOFOL 50 MG: 10 INJECTION, EMULSION INTRAVENOUS at 12:54

## 2018-11-09 RX ADMIN — ONDANSETRON 4 MG: 2 INJECTION INTRAMUSCULAR; INTRAVENOUS at 12:43

## 2018-11-09 RX ADMIN — IODIXANOL 200 ML: 320 INJECTION, SOLUTION INTRAVASCULAR at 12:00

## 2018-11-09 RX ADMIN — SODIUM CHLORIDE: 900 INJECTION, SOLUTION INTRAVENOUS at 12:37

## 2018-11-09 RX ADMIN — FENTANYL CITRATE 100 MCG: 50 INJECTION, SOLUTION INTRAMUSCULAR; INTRAVENOUS at 12:40

## 2018-11-09 RX ADMIN — PROPOFOL 50 MG: 10 INJECTION, EMULSION INTRAVENOUS at 12:42

## 2018-11-09 RX ADMIN — LIDOCAINE HYDROCHLORIDE 30 ML: 10 INJECTION, SOLUTION EPIDURAL; INFILTRATION; INTRACAUDAL; PERINEURAL at 12:00

## 2018-11-09 RX ADMIN — PROPOFOL 50 MG: 10 INJECTION, EMULSION INTRAVENOUS at 13:02

## 2018-11-09 RX ADMIN — LIDOCAINE HYDROCHLORIDE 100 MG: 20 INJECTION, SOLUTION EPIDURAL; INFILTRATION; INTRACAUDAL; PERINEURAL at 12:42

## 2018-11-09 RX ADMIN — CEFAZOLIN SODIUM 2 G: 2 SOLUTION INTRAVENOUS at 12:39

## 2018-11-09 RX ADMIN — DEXAMETHASONE SODIUM PHOSPHATE 4 MG: 4 INJECTION, SOLUTION INTRA-ARTICULAR; INTRALESIONAL; INTRAMUSCULAR; INTRAVENOUS; SOFT TISSUE at 12:43

## 2018-11-09 NOTE — ANESTHESIA POSTPROCEDURE EVALUATION
* No procedures listed *. Anesthesia Post Evaluation Multimodal analgesia: multimodal analgesia used between 6 hours prior to anesthesia start to PACU discharge Patient location during evaluation: bedside Patient participation: complete - patient participated Level of consciousness: awake Pain management: adequate Airway patency: patent Anesthetic complications: no 
Cardiovascular status: acceptable Respiratory status: acceptable Hydration status: acceptable Visit Vitals BP (!) 161/92 Pulse 97 Temp 36.6 °C (97.8 °F) Resp 18 SpO2 96%

## 2018-11-09 NOTE — DISCHARGE INSTRUCTIONS
INFERIOR VENA CAVA FILTER DISCHARGE INSTRUCTIONS    General Information:      Your doctor has asked us to put a filter in your inferior vena cava (the largest vein in your abdomen) to catch blood clots from moving from your legs into your lungs, heart, and brain. The filters are fixed in the vessel, and in most cases are permanent. Your doctor may also put you on blood thinners to prevent the formation of blood clots. This filter is sometimes placed prior to a major surgery if you have a history of blood clots. Home Care Instructions: You can resume your regular diet and medication regimen. Do not drink alcohol, drive, or make any important legal decisions in the next 24 hours. Do not lift anything heavier than a gallon of milk, or do anything strenuous for the next 24 hours. You will notice a dressing on your neck or groin. This was the site of the insertion for the procedure. Keep the site covered until the puncture site has totally healed. You make take a shower with the bandage, but do cover it with plastic wrap. Do not immerse yourself in water until the wound has totally healed. After your puncture wound heals, there is no special care that is needed. You will receive a card for your wallet that has information on it about the filter. You will need to show this to any physician that takes care of you. Keep it with your 's license and insurance cards. It is important for your doctor to know what kind of filter you have, as there are several brands. You may resume your normal level of activity slowly, after about one week of light activity. Call If:     You should call your Physician and/or the Radiology Nurse if you have any bleeding other than a small spot on your bandage. Call if you have any signs of infection, fever, swelling, or increased pain at the site. Call if you have any questions of how to take care of your wound.     Follow-Up Instructions: Please see your ordering doctor as he/she has requested. DISCHARGE SUMMARY from Nurse    PATIENT INSTRUCTIONS:    After general anesthesia or intravenous sedation, for 24 hours or while taking prescription Narcotics:  · Limit your activities  · Do not drive and operate hazardous machinery  · Do not make important personal or business decisions  · Do  not drink alcoholic beverages  · If you have not urinated within 8 hours after discharge, please contact your surgeon on call. Report the following to your surgeon:  · Excessive pain, swelling, redness or odor of or around the surgical area  · Temperature over 100.5  · Nausea and vomiting lasting longer than 4 hours or if unable to take medications  · Any signs of decreased circulation or nerve impairment to extremity: change in color, persistent  numbness, tingling, coldness or increase pain  · Any questions    *  Please give a list of your current medications to your Primary Care Provider. *  Please update this list whenever your medications are discontinued, doses are      changed, or new medications (including over-the-counter products) are added. *  Please carry medication information at all times in case of emergency situations. These are general instructions for a healthy lifestyle:    No smoking/ No tobacco products/ Avoid exposure to second hand smoke  Surgeon General's Warning:  Quitting smoking now greatly reduces serious risk to your health. Obesity, smoking, and sedentary lifestyle greatly increases your risk for illness    A healthy diet, regular physical exercise & weight monitoring are important for maintaining a healthy lifestyle    You may be retaining fluid if you have a history of heart failure or if you experience any of the following symptoms:  Weight gain of 3 pounds or more overnight or 5 pounds in a week, increased swelling in our hands or feet or shortness of breath while lying flat in bed.   Please call your doctor as soon as you notice any of these symptoms; do not wait until your next office visit. Recognize signs and symptoms of STROKE:    F-face looks uneven    A-arms unable to move or move unevenly    S-speech slurred or non-existent    T-time-call 911 as soon as signs and symptoms begin-DO NOT go       Back to bed or wait to see if you get better-TIME IS BRAIN. Warning Signs of HEART ATTACK     Call 911 if you have these symptoms:   Chest discomfort. Most heart attacks involve discomfort in the center of the chest that lasts more than a few minutes, or that goes away and comes back. It can feel like uncomfortable pressure, squeezing, fullness, or pain.  Discomfort in other areas of the upper body. Symptoms can include pain or discomfort in one or both arms, the back, neck, jaw, or stomach.  Shortness of breath with or without chest discomfort.  Other signs may include breaking out in a cold sweat, nausea, or lightheadedness. Don't wait more than five minutes to call 911 - MINUTES MATTER! Fast action can save your life. Calling 911 is almost always the fastest way to get lifesaving treatment. Emergency Medical Services staff can begin treatment when they arrive -- up to an hour sooner than if someone gets to the hospital by car. The discharge information has been reviewed with the patient and spouse. The patient and spouse verbalized understanding. Discharge medications reviewed with the patient and spouse and appropriate educational materials and side effects teaching were provided.   ___________________________________________________________________________________________________________________________________

## 2018-11-09 NOTE — PROCEDURES
RADIOLOGY POST PROCEDURE NOTE     November 9, 2018       1:45 PM     Preoperative Diagnosis:   Completion of therapy. PE's and DVT. Factor V deficiency. Postoperative Diagnosis:  Same. :  Dr. Florence Schmitt    Assistant:  None. Type of Anesthesia: 1% plain lidocaine and IV deep sedation per anaesthesia. Procedure/Description:  Image guided IVC filter removal.    Findings:   No bleeding. Estimated blood Loss:  Minimal    Specimen Removed:   no    Blood transfusions:  None. Implants:  None.     Complications: None    Condition: Stable    Discharge Plan:  discharge home     Shi Nunn MD

## 2018-11-09 NOTE — PROGRESS NOTES
1114:   Cath holding summary     Patient escorted to cath holding from waiting area ambulatory, alert and oriented x 4, voicing no complaints. Changed into gown and placed on monitor. NPO since MN. Lab results, med rec and H&P reviewed on chart. PIV x 1 inserted without difficulty. Family to bedside. 1237: Pt taken to procedure. Wife in heart center waiting room. She is aware pt to go to PACU after procedure.

## 2018-11-09 NOTE — ANESTHESIA PREPROCEDURE EVALUATION
Anesthetic History No history of anesthetic complications Review of Systems / Medical History Patient summary reviewed and pertinent labs reviewed Pulmonary Within defined limits Undiagnosed apnea Neuro/Psych Within defined limits Cardiovascular Within defined limits GI/Hepatic/Renal 
Within defined limits Endo/Other Obesity Other Findings Physical Exam 
 
Airway Mallampati: III 
TM Distance: 4 - 6 cm Neck ROM: normal range of motion Mouth opening: Normal 
 
 Cardiovascular Dental 
No notable dental hx Pulmonary Abdominal 
GI exam deferred Other Findings Anesthetic Plan ASA: 3 Anesthesia type: MAC Anesthetic plan and risks discussed with: Patient

## 2018-11-09 NOTE — PERIOP NOTES
Notified Dr Karie Islas of patient's diastolic pressure being in the 90's post procedure. No intervention needed per Dr Karie Islas.

## 2018-11-09 NOTE — H&P
OUTPATIENT HISTORY AND PHYSICAL      Today 11/9/2018     Indication/Symptoms:   Sadi Kearney is a 48 y.o. male with a history of PE s/p IVC filter placement who presents for an image-guided IVC filter removal with anesthesia. Patient has been NPO since midnight and takes Elqiuis. Current Meds:    Prior to Admission medications    Medication Sig Start Date End Date Taking? Authorizing Provider   losartan-hydroCHLOROthiazide (HYZAAR) 100-12.5 mg per tablet Take 1 Tab by mouth daily. Yes Provider, Historical   amLODIPine 10 mg tab 10 mg, valsartan 320 mg tab 320 mg Take  by mouth daily. Yes Provider, Historical   apixaban (ELIQUIS) 2.5 mg tablet Take 1 Tab by mouth two (2) times a day. 8/22/18   Hortencia Bustamante MD   diclofenac (VOLTAREN) 1 % gel Apply  to affected area four (4) times daily. Provider, Historical       Allergies:    No Known Allergies    Comorbid Conditions:    Past Medical History:   Diagnosis Date    Hidradenitis 07/2018    Pulmonary embolism ([facility] [address]     Rash 2017        History reviewed. No pertinent surgical history. Data:    There were no vitals taken for this visit.:  Recent Labs     11/09/18  1120        Recent Labs     11/09/18  1120   INR 0.9   APTT 25.4       The H & P and/or progress notes and any available imaging were reviewed. The risks, indications and possible alternatives to the procedure, including doing nothing, were discussed and informed consent was obtained. Physical Exam:      Mental status:   Alert and oriented. Examination specific to the procedure proposed to be performed and any co morbid conditions:      Heart:   Regular rate. Lungs:   Normal respiratory effort. Symmetrical rise and fall of chest    The patient is an appropriate candidate to undergo the planned procedure.  Appropriateness for anesthesia to be determined by Anesthesia team.     JOSE Land

## 2019-01-17 ENCOUNTER — HOSPITAL ENCOUNTER (OUTPATIENT)
Dept: NON INVASIVE DIAGNOSTICS | Age: 54
Discharge: HOME | End: 2019-01-17
Attending: INTERNAL MEDICINE
Payer: OTHER GOVERNMENT

## 2019-01-17 VITALS
WEIGHT: 234 LBS | DIASTOLIC BLOOD PRESSURE: 92 MMHG | SYSTOLIC BLOOD PRESSURE: 134 MMHG | HEIGHT: 67 IN | BODY MASS INDEX: 36.73 KG/M2

## 2019-01-17 DIAGNOSIS — I26.92 SADDLE EMBOLUS OF PULMONARY ARTERY WITHOUT ACUTE COR PULMONALE, UNSPECIFIED CHRONICITY (HCC): ICD-10-CM

## 2019-01-17 DIAGNOSIS — R94.30 ELEVATED RIGHT VENTRICULAR END-DIASTOLIC PRESSURE: ICD-10-CM

## 2019-01-17 DIAGNOSIS — D68.51 FACTOR V LEIDEN (HCC): ICD-10-CM

## 2019-01-17 LAB
ECHO LA AREA 4C: 17 CM2
ECHO LA VOL 2C: 56.02 ML (ref 18–58)
ECHO LA VOL 4C: 41.32 ML (ref 18–58)
ECHO LA VOL BP: 52.15 ML (ref 18–58)
ECHO LA VOL/BSA BIPLANE: 24.13 ML/M2 (ref 16–28)
ECHO LA VOLUME INDEX A2C: 25.92 ML/M2 (ref 16–28)
ECHO LA VOLUME INDEX A4C: 19.12 ML/M2 (ref 16–28)
ECHO LV EDV A4C: 57.4 ML
ECHO LV EDV INDEX A4C: 26.6 ML/M2
ECHO LV EJECTION FRACTION A4C: 60 %
ECHO LV ESV A4C: 23.1 ML
ECHO LV ESV INDEX A4C: 10.7 ML/M2
ECHO LV INTERNAL DIMENSION DIASTOLIC: 4.75 CM (ref 4.2–5.9)
ECHO LV INTERNAL DIMENSION SYSTOLIC: 3.43 CM
ECHO LV IVSD: 1.1 CM (ref 0.6–1)
ECHO LV MASS 2D: 215.9 G (ref 88–224)
ECHO LV MASS INDEX 2D: 99.9 G/M2 (ref 49–115)
ECHO LV POSTERIOR WALL DIASTOLIC: 1.04 CM (ref 0.6–1)
ECHO LVOT PEAK GRADIENT: 2.2 MMHG
ECHO LVOT PEAK VELOCITY: 73.53 CM/S
ECHO LVOT VTI: 13.47 CM
ECHO MV A VELOCITY: 48.39 CM/S
ECHO MV E DECELERATION TIME (DT): 139.5 MS
ECHO MV E VELOCITY: 0.72 CM/S
ECHO MV E/A RATIO: 0.01
ECHO PULMONARY ARTERY SYSTOLIC PRESSURE (PASP): 30 MMHG
ECHO TV REGURGITANT MAX VELOCITY: 286.1 CM/S
ECHO TV REGURGITANT PEAK GRADIENT: 32.7 MMHG

## 2019-01-17 PROCEDURE — 93306 TTE W/DOPPLER COMPLETE: CPT

## 2019-04-24 ENCOUNTER — HOSPITAL ENCOUNTER (OUTPATIENT)
Dept: CT IMAGING | Age: 54
Discharge: HOME | End: 2019-04-24
Attending: INTERNAL MEDICINE
Payer: OTHER GOVERNMENT

## 2019-04-24 DIAGNOSIS — D68.51 FACTOR V LEIDEN (HCC): ICD-10-CM

## 2019-04-24 DIAGNOSIS — I26.92 SADDLE EMBOLUS OF PULMONARY ARTERY WITHOUT ACUTE COR PULMONALE, UNSPECIFIED CHRONICITY (HCC): ICD-10-CM

## 2019-04-24 DIAGNOSIS — R91.1 LUNG NODULE: ICD-10-CM

## 2019-04-24 PROCEDURE — 71260 CT THORAX DX C+: CPT

## 2019-04-24 PROCEDURE — 74011636320 HC RX REV CODE- 636/320: Performed by: INTERNAL MEDICINE

## 2019-04-24 RX ADMIN — IOPAMIDOL 100 ML: 612 INJECTION, SOLUTION INTRAVENOUS at 08:19

## 2019-05-23 ENCOUNTER — OFFICE VISIT (OUTPATIENT)
Dept: PULMONOLOGY | Age: 54
End: 2019-05-23

## 2019-05-23 VITALS
OXYGEN SATURATION: 98 % | DIASTOLIC BLOOD PRESSURE: 70 MMHG | WEIGHT: 240 LBS | HEART RATE: 72 BPM | TEMPERATURE: 96.9 F | RESPIRATION RATE: 20 BRPM | SYSTOLIC BLOOD PRESSURE: 120 MMHG | BODY MASS INDEX: 37.67 KG/M2 | HEIGHT: 67 IN

## 2019-05-23 DIAGNOSIS — I82.403 RECURRENT ACUTE DEEP VEIN THROMBOSIS (DVT) OF BOTH LOWER EXTREMITIES (HCC): Primary | ICD-10-CM

## 2019-05-23 RX ORDER — ATORVASTATIN CALCIUM 10 MG/1
TABLET, FILM COATED ORAL DAILY
COMMUNITY

## 2019-05-23 RX ORDER — BUDESONIDE 3 MG/1
CAPSULE, COATED PELLETS ORAL
Refills: 1 | COMMUNITY
Start: 2019-04-30

## 2019-05-23 NOTE — PROGRESS NOTES
[address] [place] [address], [facility] [address] [place] [address] [facility]  Pulmonary, Critical Care, and Sleep Medicine    Pulmonary Office Followup  Name: Olimpia Reaves 48 y.o. male  MRN: [id number]  : 1965  Service Date: 19  Chief Complaint:   Chief Complaint   Patient presents with    Lung Nodule     Echo done ---CT done        History of Present Illness:  Jake Castro is a 48 y.o. male, who presents to Pulmonary clinic for followup of VTE/PE. Pt last seen on 2018. In the interval, pt reports he switched his PCP to the [place]. He was started on physical therapy recently for his legs and back. He reports it has helped his mobility. Pt reports he is wearing his CPAP, done by [facility] in [facility]. He reports wearing it \"most nights\". He reports missing nights when he travels. Patient reports he is compliant with his Eliquis. Pt reports no respiratory complaints in the interval, denies SOB with exertion, cough, hemoptysis, chest pain/angina, N/V/D, orthopnea.   Patient does report that his IVC filter was been removed in the interim      Past Medical History:   Diagnosis Date    Colitis     Hidradenitis 2018    Hypertension     Pulmonary embolism ([address]     Rash 2017     Past Surgical History:   Procedure Laterality Date    HX COLONOSCOPY       Family History   Problem Relation Age of Onset    No Known Problems Mother     Hypertension Father     Cancer Father     Stroke Father      Social History     Socioeconomic History    Marital status:      Spouse name: Not on file    Number of children: Not on file    Years of education: Not on file    Highest education level: Not on file   Occupational History    Not on file   Social Needs    Financial resource strain: Not on file    Food insecurity:     Worry: Not on file     Inability: Not on file    Transportation needs:     Medical: Not on file     Non-medical: Not on file   Tobacco Use    Smoking status: Former Smoker     Packs/day: 1.00     Years: 30.00     Pack years: 30.00     Types: Cigarettes     Last attempt to quit: 2/1/2004     Years since quitting: 15.3    Smokeless tobacco: Never Used   Substance and Sexual Activity    Alcohol use: Yes    Drug use: Never    Sexual activity: Not on file   Lifestyle    Physical activity:     Days per week: Not on file     Minutes per session: Not on file    Stress: Not on file   Relationships    Social connections:     Talks on phone: Not on file     Gets together: Not on file     Attends Voodoo service: Not on file     Active member of club or organization: Not on file     Attends meetings of clubs or organizations: Not on file     Relationship status: Not on file    Intimate partner violence:     Fear of current or ex partner: Not on file     Emotionally abused: Not on file     Physically abused: Not on file     Forced sexual activity: Not on file   Other Topics Concern    Not on file   Social History Narrative    Not on file       Allergies: I have reviewed the allergy hx  No Known Allergies    Medications:  I have reviewed the patient's medications  Prior to Admission medications    Medication Sig Start Date End Date Taking? Authorizing Provider   atorvastatin (LIPITOR) 10 mg tablet Take  by mouth daily. Yes Provider, Historical   budesonide (ENTOCORT EC) 3 mg capsule  4/30/19  Yes Provider, Historical   Ferrous Fumarate 325 mg (106 mg iron) tab Take  by mouth. Yes Provider, Historical   losartan-hydroCHLOROthiazide (HYZAAR) 100-12.5 mg per tablet Take 1 Tab by mouth daily. Yes Provider, Historical   apixaban (ELIQUIS) 2.5 mg tablet Take 1 Tab by mouth two (2) times a day. 8/22/18  Yes Cholo Mcgarry MD   amLODIPine 10 mg tab 10 mg, valsartan 320 mg tab 320 mg Take  by mouth daily. Yes Provider, Historical   diclofenac (VOLTAREN) 1 % gel Apply  to affected area four (4) times daily.     Provider, Historical Immunizations:  I have reviewed the patient's immunizations    There is no immunization history on file for this patient. Review of Systems:  A complete review of systems was performed as stated in the HPI, all others are negative. Objective:    Physical Exam:  /70 (BP 1 Location: Right arm, BP Patient Position: Sitting)   Pulse 72   Temp 96.9 °F (36.1 °C) (Oral)   Resp 20   Ht 5' 7\" (1.702 m)   Wt 108.9 kg (240 lb)   SpO2 98%   BMI 37.59 kg/m²   Vitals were personally reviewed  Gen: no acute distress, pleasant and cooperative, sitting up in chair, able to climb to exam table w/o difficulty  HEENT: normocephalic/atraumatic, PERRLA, EOMI, no scleral icterus  Neck: supple, trachea midline, no JVD  CVS: regular rate rhythm, S1/S2, no murmurs/rubs/gallops  Lungs: good air entry B/L, no wheezes/rales/rhonchi  Ext: no pitting edema B/L, no peripheral cyanosis or clubbing  Neuro: grossly normal, AAOx3, normal strength and coordination grossly, no focal deficits  Psych: normal memory, thought content, and processing    Labs:   I have reviewed the patient's available labs  Lab Results   Component Value Date/Time    WBC 7.5 11/09/2018 11:20 AM    HGB 14.9 11/09/2018 11:20 AM    HCT 43.9 11/09/2018 11:20 AM    PLATELET 850 64/86/1253 11:20 AM    MCV 87.8 11/09/2018 11:20 AM     Lab Results   Component Value Date/Time    Sodium 141 11/09/2018 11:20 AM    Potassium 3.5 11/09/2018 11:20 AM    Chloride 104 11/09/2018 11:20 AM    CO2 30 11/09/2018 11:20 AM    Anion gap 7 11/09/2018 11:20 AM    Glucose 90 11/09/2018 11:20 AM    BUN 10 11/09/2018 11:20 AM    Creatinine 0.85 11/09/2018 11:20 AM    BUN/Creatinine ratio 12 11/09/2018 11:20 AM    GFR est AA >60 11/09/2018 11:20 AM    GFR est non-AA >60 11/09/2018 11:20 AM    Calcium 9.1 11/09/2018 11:20 AM       Imaging:  I have personally reviewed patient's imaging --CT chest from 4/24/2019 shows interval resolution of the lung mass/infiltrate-this was likely due to pulmonary infarction. Patient does have some scattered subcentimeter pulmonary nodules which have not changed, these are benign. No masses, effusions, infiltrates. Official read per radiology  CT Results (most recent):  Results from [place] encounter on 04/24/19   CT CHEST W CONT    Narrative EXAM: CT CHEST W CONT    INDICATION: lung nodule surveillance    COMPARISON: CT scan of the chest dated July 24, 2018 area  . CONTRAST: 80 mL of Isovue-300. TECHNIQUE:   Multislice helical CT was performed from the thoracic inlet to the adrenal  glands during uneventful rapid bolus intravenous contrast administration. Contiguous 5 mm axial images were reconstructed and lung and soft tissue windows  were generated. Coronal and sagittal reformations were generated. CT dose  reduction was achieved through use of a standardized protocol tailored for this  examination and automatic exposure control for dose modulation. FINDINGS:  The current exam when compared with the previous study shows the infiltrative  process peripherally in the right lung on the previous study has cleared on the  current exam. The 7 mm nodule in the left lower lung field is no longer present. No new nodules are seen. No evidence for a pleural effusion or pneumothorax is  seen. .     The aorta enhances normally with no evidence of aneurysm or dissection. There is no mediastinal or hilar or axillary adenopathy. The visualized portions of the upper abdominal organs are normal.      Impression IMPRESSION: Absence of the infiltrative area in the right lung and the 7 mm  nodule in the left lower lung on the current exam.   No new nodules infiltrates pleural effusions or pneumothoraces. No evidence for mediastinal, hilar or axillary adenopathy. .       TTE:  I have reviewed the patient's TTE results  Results for orders placed or performed during the hospital encounter of 01/21/18   2D ECHO COMPLETE ADULT (TTE) W OR WO CONTR Status: None    [address]  [place], [phone]    Transthoracic Echocardiogram    Patient: Luz Ellis  MRN: [id number]  ACCT #: [de-identified]  : 1965  Age: 46 years  Gender: Male  Height: 73 in  Weight: 237.6 lb  BSA: 2.32 m-sq  BP: 151 / 99 mmHg  Study date: 2018  Status: Routine  Location: [facility] DMS [address] #: [id number]    Allergies: NO KNOWN ALLERGIES    Referring_Ordering Physician:  [facility] Hospitalist  Interpreting Group:  [address] [place] [address]  Interpreting Physician:  Juanpablo Shrestha MD  Technologist:  MEREDITH Heard, RD    IMPRESSIONS:  A clinically significant myopathic process is ruled out. The findings suggest   the possibility of pulmonary embolism. Features were consistent with pulmonary hypertension. SUMMARY:  Left ventricle: Systolic function was normal by visual assessment. Ejection   fraction was estimated in the range of 55 %  to 60 %. No obvious wall motion abnormalities identified in the views   obtained. Right ventricle: The ventricle was moderately dilated. Systolic pressure was   mildly increased. Estimated peak pressure  was 37 mmHg. There was a structure in the RV apex, could not exclude   moderator band. Right atrium: The atrium was moderately dilated. Inferior vena cava, hepatic veins: The inferior vena cava was dilated. INDICATIONS: Chest pain, PE.    HISTORY: Prior history: Pulmonary embolism. PROCEDURE: This was a routine study. The study included complete 2D imaging,   M-mode, complete spectral Doppler, and  color Doppler. The heart rate was 74 bpm, at the start of the study. Systolic   blood pressure was 151 mmHg, at the start  of the study. Diastolic blood pressure was 99 mmHg, at the start of the   study. Images were obtained from the  parasternal, apical, subcostal, and suprasternal notch acoustic windows. Echocardiographic views were limited by poor  acoustic window availability.  This was a technically difficult study. LEFT VENTRICLE: Size was normal. Systolic function was normal by visual   assessment. Ejection fraction was estimated in  the range of 55 % to 60 %. No obvious wall motion abnormalities identified in   the views obtained. Wall thickness was  normal. DOPPLER: Left ventricular diastolic function parameters were normal   for the patient's age. RIGHT VENTRICLE: The ventricle was moderately dilated. Systolic function was   normal. There was a structure in the RV  apex, could not exclude moderator band. DOPPLER: Systolic pressure was mildly   increased. Estimated peak pressure was 37  mmHg. LEFT ATRIUM: Size was normal. LA volume index was 19 ml/m-sq. RIGHT ATRIUM: The atrium was moderately dilated. MITRAL VALVE: There was annular calcification. Normal valve structure. DOPPLER: There was no evidence for stenosis. There was no significant regurgitation. AORTIC VALVE: The valve was probably trileaflet. Leaflets exhibited good   mobility. DOPPLER: There was no stenosis. There was no significant regurgitation. TRICUSPID VALVE: Normal valve structure. DOPPLER: There was no evidence for   tricuspid stenosis. There was trivial  regurgitation. Right atrial pressure estimate: 8 mmHg. PULMONIC VALVE: Not well visualized. DOPPLER: There was no stenosis. There   was trivial regurgitation. AORTA: The root exhibited normal size. SYSTEMIC VEINS: IVC: The inferior vena cava was dilated. Respirophasic   changes were normal.    PERICARDIUM: Insignificant pericardial effusion and/or pericardial fat was   present.     SYSTEM MEASUREMENT TABLES    2D  Ao Diam: 2.91 cm  IVSd: 0.89 cm  LVIDd: 4.98 cm  LVIDs: 3.12 cm  LVPWd: 0.89 cm  SV(Teich): 78.87 ml  EDV(Teich): 117.23 ml  ESV(Cube): 30.23 ml  ESV(Teich): 38.37 ml  LAAs A2C: 14.87 cm2  LAAs A4C: 16.68 cm2  LAESV A-L A2C: 38.5 ml  LAESV A-L A4C: 45.93 ml  LAESV Index (A-L): 18.61 ml/m2  LAESV MOD A2C: 37.22 ml  LAESV MOD A4C: 41.77 ml  LAESV(A-L): 43.18 ml  LALs A2C: 4.88 cm  LALs A4C: 5.14 cm  LVEDV MOD A4C: 71.82 ml  LVESV MOD A4C: 23.91 ml  LVLd A4C: 8.1 cm  LVLs A4C: 7 cm  LVOT Diam: 1.99 cm  RA Area: 23.1 cm2  RV Major: 5.92 cm  RV Minor: 4.4 cm  SV MOD A4C: 47.91 ml    CW  TR Vmax: 2.71 m/s  IVRT: 82.6 ms  TR maxP.29 mmHG    MM  Tapse: 2.43 cm    PW  LVOT VTI: 16.74 cm  LVOT Vmax: 1.06 m/s  LVOT Vmean: 0.69 m/s  MV A Alex: 0.82 m/s  MV Dec [place]: 3.95 m/s2  MV DecT: 185.16 ms  MV E Laex: 0.73 m/s  MV E/A Ratio: 0.89  LVOT Env. Ti: 241.89 ms  LVOT maxP.52 mmHG  LVOT meanP.33 mmHG  LVSI Dopp: 22.4 ml/m2  LVSV Dopp: 51.96 ml    Prepared and E-signed by    Juanpablo Agrawal MD  Signed 2018 15:45:56       PFTs: 2018 shows nonspecific ventilatory defect - restrictive neal, normal lung volumes, and normal diffusion    Assessment and Plan:  48 y.o. male with:    Impression:  1. Recurrent VTE - acute DVT and PE on 18 pt s/p IVC filter placement on 18 by IR - Dr. Jermaine Lopez. Prior PE was treated with 1 year of anticoagulation and acutely with tPA about 5 years ago. IVC filter removed on 2018. Patient now on prophylactic dosing of Eliquis. 2. Factor V Leiden deficiency:  Pt seen by Hematology - Dr. Vy Pearce  3. Lung nodules: One large lung nodule resolved, likely due to pulmonary infarction. Other subcentimeter pulmonary nodules, unchanged, benign. 4. RV strain:  Resolved, no evidence of pulmonary hypertension on last transthoracic echo  5. Former smoker:  Quit in     Plan:  -No further serial CT scans required, nodules are unchanged, less than 4 mm, per [employer] guidelines.   -Repeat TTE before next visit  -Continue Eliquis therapy at 2.5 mg twice daily indefinitely.  -Counseled the patient regarding avoiding contact sports and high risk activity that would result in head injury or body injury due to increased risk of ICH or hematoma/lifethreatining bleed  -Management of hypercoagulability per hematology  -Advised to remain active    Follow-up and Dispositions    · Return in about 1 year (around 5/23/2020).          Serena Bell MD/MPH     Pulmonary, Critical Care Medicine  [facility]

## 2019-05-23 NOTE — LETTER
5/24/2019 5:49 PM 
 
Patient:  Abby Melendrez YOB: 1965 Date of Visit: 5/23/2019 Dear Johny Weathers NP 
[place] [address] VIA Facsimile: [phone] Page Curry NP 
[address] [place] [address] VIA Facsimile: [phone] Francisco Garcia MD 
[place] [address] VIA Facsimile: [phone] 
 : Thank you for referring Mr. Elijah Alvarez to me for evaluation/treatment. Below are the relevant portions of my assessment and plan of care. If you have questions, please do not hesitate to call me. I look forward to following Mr. Reaves along with you. Sincerely, Jose Wray MD/MPH Pulmonary, Critical Care Medicine [facility]

## 2019-05-23 NOTE — Clinical Note
5/24/19 Patient: Stephanie Lyon YOB: 1965 Date of Visit: 5/23/2019 Alyssa Ribeiro NP 
[place] [address] VIA Facsimile: [phone] Dear Alyssa Ribeiro NP, Thank you for referring Mr. Dat Ahn to [address] [place] [address] for evaluation. My notes for this consultation are attached. If you have questions, please do not hesitate to call me. I look forward to following your patient along with you. Sincerely, Andrés Mccormack MD

## 2019-05-23 NOTE — PROGRESS NOTES
Fer Keen presents today for   Chief Complaint   Patient presents with    Lung Nodule     Echo done 1/17---CT done 4/24       Is someone accompanying this pt? No     Is the patient using any DME equipment during OV? No     -DME Company n/a    Depression Screening:  3 most recent PHQ Screens 5/23/2019   Little interest or pleasure in doing things Not at all   Feeling down, depressed, irritable, or hopeless Not at all   Total Score PHQ 2 0       Learning Assessment:  Learning Assessment 5/23/2019   PRIMARY LEARNER Patient   PRIMARY LANGUAGE ENGLISH   LEARNER PREFERENCE PRIMARY DEMONSTRATION   ANSWERED BY patient   RELATIONSHIP SELF       Abuse Screening:  Abuse Screening Questionnaire 5/23/2019   Do you ever feel afraid of your partner? N   Are you in a relationship with someone who physically or mentally threatens you? N   Is it safe for you to go home? Y       Fall Risk  No flowsheet data found. Coordination of Care:  1. Have you been to the ER, urgent care clinic since your last visit? Hospitalized since your last visit? Yes; Name: [facility]     2. Have you seen or consulted any other health care providers outside of the [address] [place] [address] since your last visit? Include any pap smears or colon screening.  [facility]

## 2019-06-24 DIAGNOSIS — I26.92 SADDLE EMBOLUS OF PULMONARY ARTERY WITHOUT ACUTE COR PULMONALE, UNSPECIFIED CHRONICITY (HCC): ICD-10-CM

## 2019-06-24 DIAGNOSIS — R91.1 LUNG NODULE: ICD-10-CM

## 2019-06-24 DIAGNOSIS — D68.51 FACTOR V LEIDEN (HCC): ICD-10-CM

## 2023-09-21 NOTE — ED TRIAGE NOTES
ASC Screening    ASC Screening  BMI > than 45: No  Are you currently pregnant?: No  Do you rely on a wheelchair for mobility?: No  Do you need oxygen during the day?: No  Have you ever been informed by anesthesia that you have a difficult airway?: No  Have you been diagnosed with End Stage Renal Disease (ESRD)?: No  Are you actively on dialysis?: No  Have you been diagnosed with Pulmonary Hypertension?: No  Do you have a pacemaker or an Automatic Implantable Cardioverter Defibrillator (AICD)?: No  Have you ever had an organ transplant?: No  Have you had a stroke, heart attack, myocardial infarction (MI) within the last 6 months?: No  Have you ever been diagnosed with Aortic Stenosis?: No  Have you ever been diagnosed  with Congestive Heart Failure?: No  Have you ever been diagnosed with a heart valve disease?: No  Are you Diabetic?: No  If you are Diabetic, has your A1C been greater than 12 within the last six months?: N/A Pt arrived c/o shortness of breath that started this morning but has had a dry cough for weeks now, hx of saddle PEs, a&ox4, wife at bedside.